# Patient Record
Sex: FEMALE | Race: WHITE
[De-identification: names, ages, dates, MRNs, and addresses within clinical notes are randomized per-mention and may not be internally consistent; named-entity substitution may affect disease eponyms.]

---

## 2017-02-01 ENCOUNTER — HOSPITAL ENCOUNTER (INPATIENT)
Dept: HOSPITAL 75 - 4TH | Age: 82
LOS: 2 days | Discharge: NURSING, REHAB OR LONG-TERM CARE | DRG: 481 | End: 2017-02-03
Attending: ORTHOPAEDIC SURGERY | Admitting: ORTHOPAEDIC SURGERY
Payer: MEDICARE

## 2017-02-01 VITALS — DIASTOLIC BLOOD PRESSURE: 68 MMHG | SYSTOLIC BLOOD PRESSURE: 112 MMHG

## 2017-02-01 VITALS — SYSTOLIC BLOOD PRESSURE: 118 MMHG | DIASTOLIC BLOOD PRESSURE: 61 MMHG

## 2017-02-01 VITALS — WEIGHT: 118 LBS | HEIGHT: 62 IN | BODY MASS INDEX: 21.71 KG/M2

## 2017-02-01 VITALS — DIASTOLIC BLOOD PRESSURE: 76 MMHG | SYSTOLIC BLOOD PRESSURE: 147 MMHG

## 2017-02-01 DIAGNOSIS — M80.052A: Primary | ICD-10-CM
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
## 2017-02-01 DIAGNOSIS — F41.9: ICD-10-CM

## 2017-02-01 DIAGNOSIS — M19.91: ICD-10-CM

## 2017-02-01 DIAGNOSIS — E03.9: ICD-10-CM

## 2017-02-01 DIAGNOSIS — I95.9: ICD-10-CM

## 2017-02-01 DIAGNOSIS — K59.09: ICD-10-CM

## 2017-02-01 DIAGNOSIS — Z87.891: ICD-10-CM
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
## 2017-02-01 DIAGNOSIS — D62: ICD-10-CM

## 2017-02-01 DIAGNOSIS — R41.0: ICD-10-CM
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
## 2017-02-01 DIAGNOSIS — K21.9: ICD-10-CM

## 2017-02-01 DIAGNOSIS — F31.9: ICD-10-CM

## 2017-02-01 PROCEDURE — 87081 CULTURE SCREEN ONLY: CPT

## 2017-02-01 PROCEDURE — 36415 COLL VENOUS BLD VENIPUNCTURE: CPT

## 2017-02-01 PROCEDURE — 83540 ASSAY OF IRON: CPT

## 2017-02-01 PROCEDURE — 80048 BASIC METABOLIC PNL TOTAL CA: CPT

## 2017-02-01 PROCEDURE — 85027 COMPLETE CBC AUTOMATED: CPT

## 2017-02-01 PROCEDURE — 0QH706Z INSERTION OF INTRAMEDULLARY INTERNAL FIXATION DEVICE INTO LEFT UPPER FEMUR, OPEN APPROACH: ICD-10-PCS | Performed by: ORTHOPAEDIC SURGERY

## 2017-02-01 PROCEDURE — 94664 DEMO&/EVAL PT USE INHALER: CPT

## 2017-02-01 RX ADMIN — SODIUM CHLORIDE, SODIUM LACTATE, POTASSIUM CHLORIDE, AND CALCIUM CHLORIDE SCH MLS/HR: 600; 310; 30; 20 INJECTION, SOLUTION INTRAVENOUS at 12:32

## 2017-02-01 RX ADMIN — SODIUM CHLORIDE, SODIUM LACTATE, POTASSIUM CHLORIDE, AND CALCIUM CHLORIDE SCH MLS/HR: 600; 310; 30; 20 INJECTION, SOLUTION INTRAVENOUS at 17:02

## 2017-02-01 RX ADMIN — MORPHINE SULFATE PRN MG: 4 INJECTION, SOLUTION INTRAMUSCULAR; INTRAVENOUS at 12:31

## 2017-02-01 RX ADMIN — DEXTROSE MONOHYDRATE AND SODIUM CHLORIDE SCH MLS/HR: 5; .45 INJECTION, SOLUTION INTRAVENOUS at 21:55

## 2017-02-01 NOTE — DIAGNOSTIC IMAGING REPORT
INDICATION: Intramedullary nailing



COMPARISON: None available



FINDINGS: Two intraoperative images of the left hip are

submitted dated February 1, 2017. Imaging was provided for Dr. Garza. Imaging demonstrates a short stem intramedullary olga

within the proximal left femur with alignment appearing

essentially anatomic. No left hip dislocation. No evidence of

immediate hardware complication.



IMPRESSION: Intraoperative fluoroscopy provided for placement of

a left gamma nail within the proximal left femur. Examination

was performed for Dr. Garza.



Fluoroscopy time: 59.5 seconds.



Dictated by:



Dictated on workstation # [id number]

## 2017-02-01 NOTE — HISTORY & PHYSICAL-SURGICAL
HPO-Surgical


History of Present Illness


Chief Complaint:  


fell today[facility] fracture of the left hip


Diagnosis/Surgical Indication:  nondisplaced intertrochanteric fracture of left 

hip


Procedure:  


intramedullary nailing of IT fracture left hip


Date of Surgery:  Feb 1, 2017


Weight (Pounds):  118


Weight (Ounces):  0.0


Height (Feet):  5


Height (Inches):  2.00





Allergies and Home Medications


Allergies


Coded Allergies:  


     Penicillins (Verified  Allergy, Unknown, 2/1/17)





Home Medications


Aspirin 81 Mg Tablet.dr 81 MG PO DAILY (Reported) 


Calcium Carbonate/Vitamin D3 1 Each Tablet 1 TAB PO BID (Reported) 


Escitalopram Oxalate 10 Mg Tablet 10 MG PO DAILY (Reported) 


Levothyroxine Sodium 125 Mcg Tablet 125 MCG PO DAILY (Reported) 


Multivitamin 1 Each Tablet 1 TAB PO DAILY (Reported) 


Omega 3 Polyunsat Fatty Acids 1,000 Mg Cap 1,000 MG PO DAILY (Reported) 





Past Medical-Social-Family Hx


Patient Social History


Alcohol Use:  Occasionally Uses


Recreational Drug Use:  No


Smoking Status:  Former Smoker


Physical Abuse Screen:  No


Sexual Abuse:  No


Recent Foreign Travel:  No


Contact w/other who traveled:  No


Recent Hopitalizations:  No


Recent Infectious Disease Expo:  No





Immunizations Up To Date


Date of Pneumonia Vaccine:  Oct 3, 2016


Date of Influenza Vaccine:  Oct 3, 2016





Seasonal Allergies


Seasonal Allergies:  No





Respiratory


Hx Respiratory Disorders:  No





Cardiovascular


Hx Cardiovascular Disorders:  No





Neurological


Hx Neurological Disorders:  No





Reproductive System


Pregnant:  No





Genitourinary


Hx Genitourinary Disorders:  No





Gastrointestinal


Gastrointestinal Disorders:  Gastroesophageal Reflux, Chronic Constipation





Musculoskeletal


Musculoskeletal Disorders:  Arthritis





Psychosocial


Behavioral Health Disorders:  Anxiety, Bipolar





Exam


Vital Signs


Capillary Refill :


General Appearance:  Oriented X3


HEENT:  Atraumatic, PERRLA


Respiratory:  Clear to Auscultation


Cardiovascular:  Regular Rate


Abdominal:  Soft, No Tenderness


Extremities:  No Clubbing, No Cyanosis, Normal Pulses, Other (Tenderness over 

left hip)


Skin:  No Rashes, No Breakdown


Neuro:  Normal Speech


Psych/Mental Status:  Mental Status NL





Assessment/Plan


Assessment and Plan


A: nondisplaced IT fracture left hip





P: intramedullary nailing left hip fracture, Cleocin preop, Plan for asa for 

DVT pro post op





Admission Diagnosis


nondisplaced intertrochanteric fracture of left hip (traumatic)








TRAE FARNSWORTH DO Feb 1, 2017 12:27 pm

## 2017-02-01 NOTE — CONSULTATION-HOSPITALIST
HPI


History of Present Illness:


HPI/Chief Complaint


CC: Left hip fracture sustained in fall at home





HPI: This is an 84yoWF that usually sees Dr Mehta who just moved his practice 

that presents to [facility] direct admit by Dr. Garza after transferred from 

[facility] due to left hip fracture sustained in a fall at home.  He 

actually repaired her right hip fracture in 2004 and had no residual since that 

time.  The plan is to have this repaired today at 1700.  Currently she is 

without pain and ready for repair.


Source:  patient, RN/MD


Exam Limitations:  no limitations


Date Seen


2/1/17


Attending Physician


Cem Garza DO


PCP





Referring Physician





Date of Admission


Feb 1, 2017 at 11:19





Home Medications & Allergies


Home Medications


Reviewed patient Home Medication Reconciliation Form





Allergies


Coded Allergies:  


     Penicillins (Verified  Allergy, Unknown, 2/1/17)





Past Medical-Social-Family Hx


Patient Social History


Marrital Status:  single


Employed/Student:  retired ([employer])


Alcohol Use:  Occasionally Uses


Recreational Drug Use:  No


Smoking Status:  Former Smoker


Physical Abuse Screen:  No


Sexual Abuse:  No


Recent Foreign Travel:  No


Contact w/other who traveled:  No


Recent Hopitalizations:  No


Recent Infectious Disease Expo:  No





Immunizations Up To Date


Date of Pneumonia Vaccine:  Oct 3, 2016


Date of Influenza Vaccine:  Oct 3, 2016





Seasonal Allergies


Seasonal Allergies:  No





Surgeries


HX Surgeries:  Yes


Surgeries:  Orthopedic





Respiratory


Hx Respiratory Disorders:  No





Cardiovascular


Hx Cardiovascular Disorders:  No





Neurological


Hx Neurological Disorders:  No





Reproductive System


Pregnant:  No





Genitourinary


Hx Genitourinary Disorders:  No





Gastrointestinal


Hx Gastrointestinal Disorders:  Yes


Gastrointestinal Disorders:  Gastroesophageal Reflux, Chronic Constipation





Musculoskeletal


Hx Musculoskeletal Disorders:  Yes


Musculoskeletal Disorders:  Arthritis





Endocrine


Hx Endocrine Disorders:  Yes


Endocrine Disorders:  Hypothyroidsim





HEENT


HX ENT Disorders:  No





Cancer


Hx Cancer:  No





Psychosocial


Hx Psychiatric Problems:  No


Behavioral Health Disorders:  Anxiety, Bipolar





Family Medical History


Significant Family History:  No Pertinent Family Hx





Review of Systems


Constitutional:   no symptoms reported see HPI


EENTM:   no symptoms reported see HPI


Respiratory:   no symptoms reported see HPI


Cardiovascular:   no symptoms reported see HPI


Gastrointestinal:   no symptoms reported see HPI


Genitourinary:   no symptoms reported see HPI


Musculoskeletal:   see HPI joint pain


Skin:   no symptoms reported see HPI


Psychiatric/Neurological:   No Symptoms Reported See HPI


All Other Systems Reviewed


Negative Unless Noted:  Yes





Physical Exam


Physical Exam


Vital Signs


Capillary Refill :


General Appearance:   No Apparent Distress WD/WN Chronically ill Thin


Eyes:  Bilateral Eye Normal Inspection, Bilateral Eye PERRL


HEENT:   PERRL/EOMI Normal ENT Inspection Pharynx Normal


Neck:   Full Range of Motion Normal Inspection Non Tender Supple Carotid Bruit


Respiratory:   Chest Non Tender Lungs Clear Normal Breath Sounds No Accessory 

Muscle Use No Respiratory Distress


Cardiovascular:   Regular Rate, Rhythm No Edema No Gallop No JVD No Murmur 

Normal Peripheral Pulses


Gastrointestinal:   Normal Bowel Sounds No Organomegaly No Pulsatile Mass Non 

Tender Soft


Back:   Normal Inspection No CVA Tenderness No Vertebral Tenderness


Extremity:   Normal Capillary Refill Normal Inspection Non Tender No Calf 

Tenderness No Pedal Edema Other (decreased ROM lower legs due to left hip 

fracture)


Neurologic/Psychiatric:   Alert Oriented x3 No Motor/Sensory Deficits Normal 

Mood/Affect


Skin:   Normal Color Warm/Dry


Lymphatic:   No Adenopathy





Assessment/Plan


Admission Diagnosis


Assessment:


Acute left hip fracture sustained in fall at home


History of right hip fracture 2000 for repair by Dr. Garza


Hypothyroidism


Presumed osteoporosis


Depression





Assessment and Plan


Surgical benefits outweigh medical risk so it would be prudent to proceed on 

with left hip fracture repair today at 1700 by Dr. Garza


Inpatient rehabilitation consult


Resume all home meds except for supplements


Monitor closely








JUSTO BRADSHAW DO Feb 1, 2017 12:43

## 2017-02-01 NOTE — PROGRESS NOTE-POST OPERATIVE
Post-Operative Progess Note


Assistant


Leonides Cooley NP-C





Pre-Operative Diagnosis


Nondisplaced Left hip intertrochanteric fracture





Post-Operative Diagnosis





same





Post-Op Procedure Note


Date of Procedure:  Feb 1, 2017


Name of Procedure:  


Intramedullary Nailing Intertrochanteric Fracture Left Hip


Procedure Note/Findings


nondisplaced IT fx  slight comminution greater trochanter


Anesthesia Type


General


Specimen(s) collected


none








TRAE FARNSWORTH DO Feb 1, 2017 6:37 pm

## 2017-02-02 VITALS — DIASTOLIC BLOOD PRESSURE: 52 MMHG | SYSTOLIC BLOOD PRESSURE: 90 MMHG

## 2017-02-02 VITALS — SYSTOLIC BLOOD PRESSURE: 97 MMHG | DIASTOLIC BLOOD PRESSURE: 58 MMHG

## 2017-02-02 VITALS — SYSTOLIC BLOOD PRESSURE: 105 MMHG | DIASTOLIC BLOOD PRESSURE: 66 MMHG

## 2017-02-02 VITALS — DIASTOLIC BLOOD PRESSURE: 53 MMHG | SYSTOLIC BLOOD PRESSURE: 112 MMHG

## 2017-02-02 VITALS — DIASTOLIC BLOOD PRESSURE: 63 MMHG | SYSTOLIC BLOOD PRESSURE: 103 MMHG

## 2017-02-02 VITALS — SYSTOLIC BLOOD PRESSURE: 92 MMHG | DIASTOLIC BLOOD PRESSURE: 53 MMHG

## 2017-02-02 LAB
ANION GAP SERPL CALC-SCNC: 10 MMOL/L (ref 5–14)
BUN SERPL-MCNC: 17 MG/DL (ref 7–18)
BUN/CREAT SERPL: 20
CALCIUM SERPL-MCNC: 7.5 MG/DL (ref 8.5–10.1)
CHLORIDE SERPL-SCNC: 104 MMOL/L (ref 98–107)
CO2 SERPL-SCNC: 25 MMOL/L (ref 21–32)
CREAT SERPL-MCNC: 0.84 MG/DL (ref 0.6–1.3)
ERYTHROCYTE [DISTWIDTH] IN BLOOD BY AUTOMATED COUNT: 12.9 % (ref 10–14.5)
GFR SERPLBLD BASED ON 1.73 SQ M-ARVRAT: > 60 ML/MIN
GLUCOSE SERPL-MCNC: 145 MG/DL (ref 70–105)
MCH RBC QN AUTO: 30 PG (ref 25–34)
MCHC RBC AUTO-ENTMCNC: 32 G/DL (ref 32–36)
MCV RBC AUTO: 94 FL (ref 80–99)
PLATELET # BLD: 201 10^3/UL (ref 130–400)
PMV BLD AUTO: 10.3 FL (ref 7.4–10.4)
POTASSIUM SERPL-SCNC: 4.1 MMOL/L (ref 3.6–5)
RBC # BLD AUTO: 2.78 10^6/UL (ref 4.35–5.85)
SODIUM SERPL-SCNC: 139 MMOL/L (ref 135–145)
WBC # BLD AUTO: 7.3 10^3/UL (ref 4.3–11)

## 2017-02-02 RX ADMIN — SODIUM CHLORIDE SCH MLS/HR: 900 INJECTION, SOLUTION INTRAVENOUS at 16:37

## 2017-02-02 RX ADMIN — DEXTROSE MONOHYDRATE AND SODIUM CHLORIDE SCH MLS/HR: 5; .45 INJECTION, SOLUTION INTRAVENOUS at 07:30

## 2017-02-02 RX ADMIN — LACTULOSE SCH GM: 20 SOLUTION ORAL at 11:33

## 2017-02-02 RX ADMIN — Medication SCH MG: at 09:12

## 2017-02-02 RX ADMIN — ASPIRIN SCH MG: 81 TABLET ORAL at 08:05

## 2017-02-02 RX ADMIN — CLINDAMYCIN PHOSPHATE SCH MLS/HR: 900 INJECTION, SOLUTION INTRAVENOUS at 01:29

## 2017-02-02 RX ADMIN — Medication SCH MG: at 17:00

## 2017-02-02 RX ADMIN — DOCUSATE SODIUM AND SENNOSIDES SCH EA: 8.6; 5 TABLET, FILM COATED ORAL at 21:02

## 2017-02-02 RX ADMIN — MORPHINE SULFATE PRN MG: 4 INJECTION, SOLUTION INTRAMUSCULAR; INTRAVENOUS at 11:22

## 2017-02-02 RX ADMIN — LACTULOSE SCH GM: 20 SOLUTION ORAL at 21:02

## 2017-02-02 RX ADMIN — KETOROLAC TROMETHAMINE PRN MG: 15 INJECTION, SOLUTION INTRAMUSCULAR; INTRAVENOUS at 11:23

## 2017-02-02 RX ADMIN — ENOXAPARIN SODIUM SCH MG: 100 INJECTION SUBCUTANEOUS at 08:06

## 2017-02-02 RX ADMIN — CLINDAMYCIN PHOSPHATE SCH MLS/HR: 900 INJECTION, SOLUTION INTRAVENOUS at 08:06

## 2017-02-02 RX ADMIN — SODIUM CHLORIDE, SODIUM LACTATE, POTASSIUM CHLORIDE, AND CALCIUM CHLORIDE SCH MLS/HR: 600; 310; 30; 20 INJECTION, SOLUTION INTRAVENOUS at 07:30

## 2017-02-02 RX ADMIN — LEVOTHYROXINE SODIUM SCH MCG: 125 TABLET ORAL at 05:42

## 2017-02-02 NOTE — OCCUPATIONAL THERAPY EVAL
OT Evaluation-General/PLF


Medical Diagnosis


Admission Date


Feb 1, 2017 at 11:19


Medical Diagnosis:  left hip fx


Onset Date:  Feb 1, 2017





Therapy Diagnosis


Therapy Diagnosis:  weakness, decr self care, decr functional mobility, decr 

activity tolerance





Height/Weight


Height (Feet):  5


Height (Inches):  2.00


Weight (Pounds):  118


Weight (Ounces):  0.0





Precautions


Precautions/Isolations:  Standard Precautions


Safety Interventions:  None





Weight Bear Status


Weight Bearing Restriction:  Weight Bearing/Tolerated


Location Restriction:  L LE





Referral


Physician:  blanca


Referral Reason:  Evaluation/Treatment





Medical History


Pertinent Medical History:  Arthritis, GERD, Hypothroidism


Additional Medical History


Chronic constipation, anxiety, depression, bipolar, osteoporosis. Hx R hip fx 

2004


Current History


Fell at home. Non-displaced intertrochanteric hip fx, repaired with nail on 2-1- 17


Reviewed History:  Yes





Social History


Home:  Single Level


Current Living Status:  Alone


Entry Into Home:  Stairs With Railing





ADL-Prior Level of Function


ADL PLOF Comments


Pt reported that she was previously able to take care of all of her basic self 

care needs. She is retired from [employer] and reported that she still drives.


Occupation:  retired from [employer] Self:  Yes





OT Current Status


Subjective


Pt seen in room, up in recliner, having difficulty staying awake. Nursing 

reported that she was just given pain meds. Pt said she was in pain but was 

unable to rate or describe it.





Appearance


Very sleepy, difficulty answering questions





Mental Status/Objective


Attachments:  Drains, Junior Catheter, IV





Current


Hand Dominance:  Right


Upper Extremity ROM


Unable to assess due to sleepiness


Upper Extremity Strength


Unable to assess due to sleepiness





ADL-Treatment


ADL-Current


Pt was unable to stay awake to assess ADLs. Transfers mod assist with PT


              Functional [place] Measure


0=Not Assessed/NA   4=Minimal Assistance


1=Total Assistance   5=Supervision or Setup


2=Maximal Assistance   6=Modified [place]


3=Moderate Assistance   7=Complete IndependenceIRFPAI Quality Coding Scale











6 Independent with activity with or without an assistive device


 


5  Patient requires set up or clean up by helper.  Patient completes activity  

by  themselves


 


4 Supervision or touching assist (CGA). [place] provide cues , steadying assist


 


3 The helper provides less than half the effort to complete the activity


 


2 The helper provides more than half the effort to complete the activity


 


1 Dependent.  The helper does all the effort to complete an activity 


 


7 Patient refused to complete or attempt activity


 


9 The patient did not perform the activity before the current illness or injury


 


88 Not attempted due to Medical conditions or safety concerns











Education


OT Patient Education:  Purpose of tx/functional activities, Rehab process


Teaching Recipient:  Patient


Teaching Methods:  Discussion


Response to Teaching:  Verbalize Understanding





OT Long Term Goals


Long Term Goals


Time Frame:  2-7-17


Eating (FIM):  6


Grooming(FIM):  6


Bathing(FIM):  5


Upper Body Dressing(FIM):  5


Lower Body Dressing(FIM):  5


Toileting(FIM):  5


Toilet/Commode Transfer(FIM):  5


Shower Transfer(FIM):  5


Additional Goals:  2-Verbalize Understanding, 3-ImproveStrength/Eavns


1=Demonstrate adherence to instructed precautions during ADL tasks.


2=Patient will verbalize/demonstrate understanding of assistive devices/

modifications for ADL.


3=Patient will improve strength/tolerance for activity to enable patient to 

perform ADL's.





OT Education/Plan


Problem List/Assessment


Assessment:  Decreased Activ Tolerance, Decreased UE Strength, Dependent 

Transfers, Impaired Bed Mobility, Impaired Funct Balance, Impaired Self-Care 

Skills





Discharge Recommendations


Plan/Recommendations:  Continue POC





Treatment Plan/Plan of Care


Treatment,Training & Education:  Yes


Patient would benefit from OT for education, treatment and training to promote 

independence in ADL's, mobility, safety and/or upper extremity function for ADL'

s.


Plan of Care:  ADL Retraining, Functional Mobility, UE Funct Exercise/Act


Treatment Duration:  Feb 7, 2017


# of days/week


5


Visits Per Week:  5


Agreement:  Yes


Rehab Potential:  Good





Time/GCodes


Start Time:  11:30


Stop Time:  11:40


Total Time Billed (hr/min):  10


Billed Treatment Time


visit, 10 minutes evaluation moderate intensity








NOMAN YOUNG OT Feb 2, 2017 11:53

## 2017-02-02 NOTE — ANESTHESIA-GENERAL POST-OP
General


Patient Condition


Mental Status/LOC:  Same as Preop


Cardiovascular:  Satisfactory


Nausea/Vomiting:  Absent


Respiratory:  Satisfactory


Pain:  Controlled


Complications:  Absent





Post Op Complications


Complications


None





Follow Up Care/Instructions


Patient Instructions


None needed.





Anesthesia/Patient Condition


Patient Condition


Patient is doing well, no complaints, stable vital signs, no apparent adverse 

anesthesia problems.   


No complications reported per nursing.








LISETH CHRISTIANSEN CRNA Feb 2, 2017 14:29

## 2017-02-02 NOTE — PHYSICAL THERAPY DAILY NOTE
PT Daily Note-Current


Subjective


Patient is in recliner and states she is having a hard time waking up.





Pain





   Numeric Pain Scale:  5-Moderate Pain


   Location:  Left


   Location Body Site:  Hip


   Pain Description:  Acute





Mental Status


Patient Orientation:  Listless


Attachments:  Oxygen, Junior Catheter, IV





Transfers


              Functional [place] Measure


0=Not Assessed/NA   4=Minimal Assistance


1=Total Assistance   5=Supervision or Setup


2=Maximal Assistance   6=Modified [place]


3=Moderate Assistance   7=Complete IndependenceIRFPAI Quality Coding Scale











6 Independent with activity with or without an assistive device


 


5  Patient requires set up or clean up by helper.  Patient completes activity  

by  themselves


 


4 Supervision or touching assist (CGA). [place] provide cues , steadying assist


 


3 The helper provides less than half the effort to complete the activity


 


2 The helper provides more than half the effort to complete the activity


 


1 Dependent.  The helper does all the effort to complete an activity 


 


7 Patient refused to complete or attempt activity


 


9 The patient did not perform the activity before the current illness or injury


 


88 Not attempted due to Medical conditions or safety concerns








Transfers (B, C, W/C) (FIM):  2


Scootin


Supine to/from Sit:  2


Sit to/from Stand:  4


Bed to/from Chair:  4


 minimal assist with transfer chair to bed with max assist with all bed 

mobility.





Weight Bearing


Weight Bearing Restriction:  Weight Bearing/Tolerated


Location Restriction:  L LE





Gait Training


Gait (FIM):  1


Distance (FIM):  1=up to 49 ft


Distance:  8'


Gait Level of Assist:  4


Gait Persons Needed:  1


Gait Assistive Device:  FWW





Assessment


PT attempted to perform exercises, however, patient fell asleep during 

treatment.  Patient is in bed with all rails up and call light in hand.





PT Short Term Goals


Short Term Goals


Time Frame:  2017


Gait (FIM):  2


Distance (FIM):  1=up to 49 ft


Gait Assistive Device:  FWW





PT Plan


Treatment/Plan


Treatment Plan:  Continue Plan of Care


Treatment Plan:  Bed Mobility, Education, Functional Activity [place], Functional 

Strength, Gait, Safety, Therapeutic Exercise, Transfers


Treatment Duration:  2017


Visits Per Week:  11


Minutes/Day (M-F):  prn





Time/GCodes


Time In:  1440


Time Out:  1450


Total Billed Treatment Time:  10


Total Billed Treatment


1 visit


FA 10 min








PASCUAL KENDALL PT 2017 15:04

## 2017-02-02 NOTE — PROGRESS NOTE-HOSPITALIST
Progress Note


HPI/CC on Admission


CC: Left hip fracture sustained in fall at home





HPI: This is an 84yoWF that usually sees Dr Mehta who just moved his practice 

that presents to [facility] direct admit by Dr. Garza after transferred from 

[facility] due to left hip fracture sustained in a fall at home.  He 

actually repaired her right hip fracture in 2004 and had no residual since that 

time.  The plan is to have this repaired today at 1700.  Currently she is 

without pain and ready for repair.





Progress Notes/Assess & Plan


Date Seen


2/2/17


Admission Dx/Process


Assessment:


Acute left hip fracture sustained in fall at home


History of right hip fracture 2000 for repair by Dr. Garza


Hypothyroidism


Presumed osteoporosis


Depression


Diagonsis/Assessment & Plan


Chart Review:


No fever


Vitals stable


Hgb 8.3 so checking iron level and empirically starting on Venofer


BNP normal





SW Review:


Pt will be a great candidate for rehab, and will have rehab eval shortly.





Patient Interview:


Pt states that her pain is manageable, but has increased recently.


Pt states that she has not been sleeping well.


Physical exam was stable.


Pt's last BM was multiple days ago.





I noted slight slowing of cognition making it higher risk for acute delirium 

considering advanced age





AFVSS, [place], O x 3 but a bit slowed compared to yesterday


Regular rate and rhythm, clear to auscultation bilaterally


No edema





Laboratory Tests


2/2/17 05:09














Assessment:


Acute left hip fracture sustained in fall at home s/p repair POD # 1


History of right hip fracture 2000 for repair by Dr. Garza


Hypothyroidism


Presumed osteoporosis


Depression


Constipation


Early delirium








Plan:


Lactulose


PT/OT


In-pt rehab eval


Monitor closely


Monitor cognition since high risk for delirium





Scribed by [place] under the direct supervision of Dr. Bradshaw.








JUSTO BRADSHAW DO Feb 2, 2017 10:55

## 2017-02-02 NOTE — PROGRESS NOTE (SOAP)
Subjective


Subjective/Events-last exam


POD #1 s/p left hip IM nailing. She is doing well this morning, pain is 

controlled, no complaints. minimal output on hemovac drain





Objective


Exam





 Vital Signs








  Date Time  Temp Pulse Resp B/P Pulse Ox O2 Delivery O2 Flow Rate FiO2


 


2/2/17 04:00 97.3 87 18 97/58 97 Room Air  


 


2/2/17 00:00 98.5 89 18 103/63 99 Room Air  


 


2/1/17 21:19       2.00 


 


2/1/17 21:10     96 Nasal Cannula 2.00 


 


2/1/17 20:00 96.8 97 16 112/68 95 Room Air  


 


2/1/17 16:00 99.4 73 18 118/61 96 Room Air  


 


2/1/17 12:00 98.0 67 18 147/76 97 Room Air  














 I & O 


 


 2/2/17





 07:00


 


Intake Total 1125 ml


 


Output Total 1885 ml


 


Balance -760 ml





Capillary Refill : Less Than 3 Seconds


General Appearance:   No Apparent Distress


Respiratory:   No Accessory Muscle Use No Respiratory Distress


Cardiovascular:   Normal Peripheral Pulses


Peripheral Pulses:  2+ Left Dors-Pedis (L)


Extremity:   Normal Capillary Refill Normal Inspection No Calf Tenderness No 

Pedal Edema


Neurologic/Psychiatric:   Alert Oriented x3 No Motor/Sensory Deficits Normal 

Mood/Affect


Skin:   Normal Color Warm/Dry (dressing to left hip CDI, minimal output of 

hemovac drain)





Results


Lab


 Laboratory Tests


2/2/17 05:09: 


Anion Gap 10, BUN/Creatinine Ratio 20, Blood Urea Nitrogen 17, Calcium Level 

7.5L, Carbon Dioxide Level 25, Chloride Level 104, Creatinine 0.84, Estimat 

Glomerular Filtration Rate > 60, Glucose Level 145H, Hematocrit 26L, Hemoglobin 

8.3L, Mean Corpuscular Hemoglobin 30, Mean Corpuscular Hemoglobin Concent 32, 

Mean Corpuscular Volume 94, Mean Platelet Volume 10.3, Platelet Count 201, 

Potassium Level 4.1, Red Blood Count 2.78L, Red Cell Distribution Width 12.9, 

Sodium Level 139, White Blood Count 7.3





Assessment/Plan


Assessment/Plan


Assess & Plan/Chief Complaint


A: s/p left hip IM nailing for nondisplaced IT fracture of left hip


Acute blood loss anemia


Hypotension





P:  for DC planning. Plan on discharge tomorrow vs Saturday. Plan 

to pull hemovac drain tomorrow AM, Up to chair TID, niferex for anemia, Dr. Pandya to manage IM


Diagnosis/Problems:  


(1) Intertrochanteric fracture of left hip


Qualifiers:  


   Qualified Code:  S72.142D - Displaced intertrochanteric fracture of left 

femur, subsequent encounter for closed fracture with routine healing








NARCISO VELOZ Feb 2, 2017 6:52 am

## 2017-02-02 NOTE — OPERATIVE REPORT
PROCEDURE PHYSICIAN:   TRAE FARNSWORTH

 

DATE OF PROCEDURE:  

02/01/2017

 

PREOPERATIVE DIAGNOSIS:

Nondisplaced intertrochanteric fracture, left hip. 

 

POSTOPERATIVE DIAGNOSIS:

Nondisplaced intertrochanteric fracture, left hip. 

 

PROCEDURE:

Intramedullary nailing intertrochanteric fracture, left hip. 

 

SURGEON:

Greg EUGENE ASSISTANT:

YESSENIA Ellison

Surgical first assist duties: Leonides Cooley surgical first

assistant was utilized throughout the entire procedure for

patient positioning, retraction of soft tissues, placement of the

internal fixation device, wound closure, and patient transfer. 

 

ANESTHESIA:

General. 

 

INDICATIONS:

The patient is an 84-year-old female who fell noting immediate

left hip pain. She was evaluated initially at [facility] in [place] where x-rays revealed a nondisplaced

intertrochanteric fracture, left hip. The patient was transported

to [facility] where an intramedullary nailing, of the

left hip fracture was performed utilizing the Synthes

trochanteric fixation nail utilizing an 11 mm 130 degrees T1

cannulated trochanteric fixation nail, 170 mm in length. A 90 mm

helical blade was utilized along with a 34 mm locking screw. 

 

PROCEDURE IN DETAIL:

The patient was transported to the operating room, where general

inhalation anesthetic was administered. The patient was placed

supine on the fracture table.  The right lower extremity was

draped out of the operating field. The left lower extremity was

placed in traction with slight traction and internal rotation.

The C-arm was used to verify fracture reduction. The patient had

slight comminution of the greater trochanter.  The fracture

otherwise remained nondisplaced. A ChloraPrep and sterile drape

of the left hip and left lower extremity was performed. A

longitudinal incision was made proximal to the area of the

greater trochanter. The incision was deepened to be the

iliotibial band. A guide pin was placed at the tip of the greater

trochanter. This was advanced into the central aspect of the

femoral shaft and verified fluoroscopically in AP and lateral

plane.  The guide pin was then over reamed. A 170 mm x 11 mm

trochanteric fixation nail was then inserted into the femoral

shaft. The out  was then used to define an additional

location for the incision distally and incision was made through

the skin fat medial iliotibial band and bluntly through the

vastus lateralis the outrigger guide was then placed in the

lateral femoral cortex. A guide pin was placed through the

lateral femoral cortex through the central aspect of the femoral

neck centered on the AP x-ray and through the posterior aspect of

the neck with a lateral x-ray. 

 

The lateral cortex was opened with the cannulated reamer.

Additional bone was then reamed up to the subchondral bone. A 90

mm helical blade was then impacted into position. The locking

mechanism was then employed to secure the trochanteric fixation

nail to the helical blade. 

 

The outrigger guide was then used to place a guidepin through the

lateral femoral cortex through the distal aspect of the nail and

through the medial cortex. A 34 mm screw was then inserted.

Wounds were irrigated with normal saline solution. The

fluoroscope revealed satisfactory position alignment and fixation

position and continuing maintenance of an anatomic position of

the intertrochanteric fracture. 

 

The iliotibial band was closed with 0-Vicryl suture simply.

Subcutaneous tissues were closed with 0 and 2-0 Vicryl suture. A

drain was placed in the distal incision. The skin was closed with

stainless steel staples. An Adaptic Neosporin bulky dressing was

placed about the left hip. The patient was awakened and was

transported postop recovery with anesthesia personnel present in

satisfactory condition. 

 

 

 

Job ID: [id number]

Dictated Date: 02/01/2017 23:56:57 

Transcription Date: 02/02/2017 13:45:57 / guerline

## 2017-02-02 NOTE — PHYSICAL THERAPY EVALUATION
PT Evaluation-General


Medical Diagnosis


Admission Date


2017 at 11:19


Medical Diagnosis:  left hip fx


Onset Date:  2017





Therapy Diagnosis


Therapy Diagnosis:  weakness; abn gait





Height/Weight


Height (Feet):  5


Height (Inches):  2.00


Weight (Pounds):  118


Weight (Ounces):  0.0





Precautions


Precautions/Isolations:  Standard Precautions





Weight Bear Status


Weight Bearing Restriction:  Weight Bearing/Tolerated





Referral


Physician:  blanca


Reason for Referral:  Evaluation/Treatment





Medical History


Pertinent Medical History:  Arthritis, GERD


Additional Medical History


anxiety, bipolar


Current History


Pt sustained a fall at home, unsure of cause and fractured her left hip; post 

repair with IM nail.  WBAT


Reviewed History:  Yes





Social History


Home:  Single Level


Current Living Status:  Alone


Entry Into Home:  Stairs With Railing





Prior/Core FIM


Prior Level of Function


              Functional [place] Measure


0=Not Assessed/NA   4=Minimal Assistance


1=Total Assistance   5=Supervision or Setup


2=Maximal Assistance   6=Modified [place]


3=Moderate Assistance   7=Complete [place]


Bed Mobility:  7


Transfers (B,C,W/C) (FIM):  7


Gait:  7


Pt indep at [facility]; cared for her home, shopped for groceries and still drives.  

No AD currently.





PT Evaluation-Current


Subjective


Agrees to PT.





Pain





   Numeric Pain Scale:  5-Moderate Pain


   Location:  Left


   Location Body Site:  Hip


   Pain Description:  Ache


   Comment:  took pain meds prior to treatment





Objective


Patient Orientation:  Person, Place, Time, Situation


Problem Solving:  Fair


Attachments:  Oxygen (insitu during and post treatment), IV





ROM/Strength


ROM Lower Extremities


WFL; left hip flexion limited due to pain


Strenght Lower Extremities


Right LE strength grossly 4/5; left LE strength grossly 3/5.





Integumentary/Posture


Integumentary


intact


Bowel Incontinence:  No


Bladder Incontinence:  Junior Cath


Posture


normal and symmetrical





Neuromuscular


(Tone, Coordination, Reflexes)


decreased coordination left LE with active movement.





Sensory


Vision:  Wears Glasses


Hearing:  Functional


Hand Dominance:  Right


Sensation Right Lower Extremit:  Intact


Sensation Left Lower Extremity:  Intact





Transfers


              Functional [place] Measure


0=Not Assessed/NA   4=Minimal Assistance


1=Total Assistance   5=Supervision or Setup


2=Maximal Assistance   6=Modified [place]


3=Moderate Assistance   7=Complete [place]


Transfers (B, C, W/C) (FIM):  3


Scooting:  3


Rollin


Supine to/from Sit:  4 (assist with left LE)


Sit to/from Stand:  3 (mod assist to come to a stand. )


bed t/f WC(FIM only if WC use):  4 (FWW with close CGA and skilled cues for 

sequencing. )


Frequent cues to sequence and complete task.





Gait


Mode of Locomotion:  Walk


Anticipated Mode of Locomotion:  Walk


Comments/Gait Description


Pt only took 6-7 steps to transfer bed to chair with FWW.  Tends to slant to 

the left and stay very close to the left side of the walker.  Difficulty 

advancing left LE and then accepting weight L.  Short steps with no heel strike 

or toe off note.d





Balance


Sitting Static:  Good


Sitting Dynamic:  Good


Standing Static:  Fair


 Standing Dynamic:  Fair





Treatment


Worked on weight shifting and foot advancement left as well as weight 

acceptance.  Slow with transfer and needs extra cues and extra time.  Pt in 

recliner with legs elevated and needs met post treatment.





Assessment/Needs


Post fall with left hip fracture.  Pt presents with decreased functional 

strength L LE, decreased balance with standing and difficulty with bed mobility

, tranfers and ambulation.  She would significantly benefit from skilled PT 

services to addres mobility and safety deficits to allow her to return home 

alone as she was prior to the fall.


Rehab Potential:  Good





PT Short Term Goals


Short Term Goals


Time Frame:  2017


Transfers (B,C,W/C) (FIM):  4


Gait (FIM):  2


Distance (FIM):  1=up to 49 ft


Gait Assistive Device:  FWW





PT Plan


Problem List


Problem List:  Activity Tolerance, Functional Strength, Safety, Balance, Gait, 

Transfer, Bed Mobility





Treatment/Plan


Treatment Plan:  Continue Plan of Care


Treatment Plan:  Bed Mobility, Education, Functional Activity [place], Functional 

Strength, Gait, Safety, Therapeutic Exercise, Transfers


Treatment Duration:  2017


# of days/week


6


Visits Per Week:  11


Minutes/Day (M-F):  prn





Safety Risks/Education


Patient Education:  Transfer Techniques, Reviewed Precautions, Safety Issues


Teaching Recipient:  Patient


Teaching Methods:  Demonstration, Discussion


Response to Teaching:  Reinforcement Needed





Time/GCodes


Time In:  1010


Time Out:  1045


Total Billed Treatment Time:  35


Total Billed Treatment


visit


EVM 15


FA 20








BRE SNYDER PT 2017 10:56

## 2017-02-03 ENCOUNTER — HOSPITAL ENCOUNTER (INPATIENT)
Dept: HOSPITAL 75 - IRF | Age: 82
LOS: 13 days | Discharge: HOME HEALTH | DRG: 560 | End: 2017-02-16
Attending: PHYSICAL MEDICINE & REHABILITATION | Admitting: PHYSICAL MEDICINE & REHABILITATION
Payer: MEDICARE

## 2017-02-03 VITALS — DIASTOLIC BLOOD PRESSURE: 73 MMHG | SYSTOLIC BLOOD PRESSURE: 124 MMHG

## 2017-02-03 VITALS — DIASTOLIC BLOOD PRESSURE: 56 MMHG | SYSTOLIC BLOOD PRESSURE: 113 MMHG

## 2017-02-03 VITALS — DIASTOLIC BLOOD PRESSURE: 58 MMHG | SYSTOLIC BLOOD PRESSURE: 140 MMHG

## 2017-02-03 VITALS — BODY MASS INDEX: 19.71 KG/M2 | WEIGHT: 107.1 LBS | HEIGHT: 62 IN

## 2017-02-03 VITALS — DIASTOLIC BLOOD PRESSURE: 63 MMHG | SYSTOLIC BLOOD PRESSURE: 100 MMHG

## 2017-02-03 VITALS — DIASTOLIC BLOOD PRESSURE: 58 MMHG | SYSTOLIC BLOOD PRESSURE: 114 MMHG

## 2017-02-03 DIAGNOSIS — R06.89: ICD-10-CM

## 2017-02-03 DIAGNOSIS — E03.9: ICD-10-CM

## 2017-02-03 DIAGNOSIS — F32.9: ICD-10-CM

## 2017-02-03 DIAGNOSIS — Z99.81: ICD-10-CM

## 2017-02-03 DIAGNOSIS — D64.9: ICD-10-CM

## 2017-02-03 DIAGNOSIS — R44.3: ICD-10-CM

## 2017-02-03 DIAGNOSIS — K59.00: ICD-10-CM

## 2017-02-03 DIAGNOSIS — M80.052D: Primary | ICD-10-CM

## 2017-02-03 DIAGNOSIS — F03.90: ICD-10-CM

## 2017-02-03 LAB
ANION GAP SERPL CALC-SCNC: 8 MMOL/L (ref 5–14)
BUN SERPL-MCNC: 12 MG/DL (ref 7–18)
BUN/CREAT SERPL: 16
CALCIUM SERPL-MCNC: 7.5 MG/DL (ref 8.5–10.1)
CHLORIDE SERPL-SCNC: 106 MMOL/L (ref 98–107)
CO2 SERPL-SCNC: 24 MMOL/L (ref 21–32)
CREAT SERPL-MCNC: 0.74 MG/DL (ref 0.6–1.3)
ERYTHROCYTE [DISTWIDTH] IN BLOOD BY AUTOMATED COUNT: 12.8 % (ref 10–14.5)
GFR SERPLBLD BASED ON 1.73 SQ M-ARVRAT: > 60 ML/MIN
GLUCOSE SERPL-MCNC: 106 MG/DL (ref 70–105)
MCH RBC QN AUTO: 31 PG (ref 25–34)
MCHC RBC AUTO-ENTMCNC: 32 G/DL (ref 32–36)
MCV RBC AUTO: 95 FL (ref 80–99)
PLATELET # BLD: 166 10^3/UL (ref 130–400)
PMV BLD AUTO: 10.3 FL (ref 7.4–10.4)
POTASSIUM SERPL-SCNC: 3.7 MMOL/L (ref 3.6–5)
RBC # BLD AUTO: 2.35 10^6/UL (ref 4.35–5.85)
SODIUM SERPL-SCNC: 138 MMOL/L (ref 135–145)
WBC # BLD AUTO: 7.1 10^3/UL (ref 4.3–11)

## 2017-02-03 PROCEDURE — 85018 HEMOGLOBIN: CPT

## 2017-02-03 PROCEDURE — 86850 RBC ANTIBODY SCREEN: CPT

## 2017-02-03 PROCEDURE — 80053 COMPREHEN METABOLIC PANEL: CPT

## 2017-02-03 PROCEDURE — 81000 URINALYSIS NONAUTO W/SCOPE: CPT

## 2017-02-03 PROCEDURE — 85014 HEMATOCRIT: CPT

## 2017-02-03 PROCEDURE — 86920 COMPATIBILITY TEST SPIN: CPT

## 2017-02-03 PROCEDURE — 85025 COMPLETE CBC W/AUTO DIFF WBC: CPT

## 2017-02-03 PROCEDURE — 36415 COLL VENOUS BLD VENIPUNCTURE: CPT

## 2017-02-03 PROCEDURE — 86901 BLOOD TYPING SEROLOGIC RH(D): CPT

## 2017-02-03 PROCEDURE — 86900 BLOOD TYPING SEROLOGIC ABO: CPT

## 2017-02-03 RX ADMIN — DOCUSATE SODIUM AND SENNOSIDES SCH EA: 8.6; 5 TABLET, FILM COATED ORAL at 09:16

## 2017-02-03 RX ADMIN — LACTULOSE SCH GM: 20 SOLUTION ORAL at 09:00

## 2017-02-03 RX ADMIN — SODIUM CHLORIDE SCH MLS/HR: 900 INJECTION, SOLUTION INTRAVENOUS at 00:57

## 2017-02-03 RX ADMIN — DOCUSATE SODIUM AND SENNOSIDES SCH EA: 8.6; 5 TABLET, FILM COATED ORAL at 10:03

## 2017-02-03 RX ADMIN — Medication SCH MG: at 18:01

## 2017-02-03 RX ADMIN — ENOXAPARIN SODIUM SCH MG: 100 INJECTION SUBCUTANEOUS at 06:24

## 2017-02-03 RX ADMIN — DOCUSATE SODIUM AND SENNOSIDES SCH EA: 8.6; 5 TABLET, FILM COATED ORAL at 21:00

## 2017-02-03 RX ADMIN — LACTULOSE SCH GM: 20 SOLUTION ORAL at 09:16

## 2017-02-03 RX ADMIN — Medication SCH MG: at 06:24

## 2017-02-03 RX ADMIN — ASPIRIN SCH MG: 81 TABLET ORAL at 09:16

## 2017-02-03 RX ADMIN — KETOROLAC TROMETHAMINE PRN MG: 15 INJECTION, SOLUTION INTRAMUSCULAR; INTRAVENOUS at 10:19

## 2017-02-03 RX ADMIN — LEVOTHYROXINE SODIUM SCH MCG: 125 TABLET ORAL at 06:24

## 2017-02-03 NOTE — PHYSICAL THERAPY EVALUATION
PT Evaluation-General


Medical Diagnosis


Admission Date





Medical Diagnosis:  intertrochanteric hip fracture l





Height/Weight


Height (Feet):  5


Height (Inches):  2.00


Weight (Pounds):  118


Weight (Ounces):  0.0





Medical History


Pertinent Medical History:  Arthritis, GERD, Hypothroidism





Prior/Core FIM


Prior Level of Function


              Functional [place] Measure


0=Not Assessed/NA   4=Minimal Assistance


1=Total Assistance   5=Supervision or Setup


2=Maximal Assistance   6=Modified [place]


3=Moderate Assistance   7=Complete [place]





PT Evaluation-Current


Transfers


              Functional [place] Measure


0=Not Assessed/NA   4=Minimal Assistance


1=Total Assistance   5=Supervision or Setup


2=Maximal Assistance   6=Modified [place]


3=Moderate Assistance   7=Complete IndependenceIRFPAI Quality Coding Scale











6 Independent with activity with or without an assistive device


 


5  Patient requires set up or clean up by helper.  Patient completes activity  

by  themselves


 


4 Supervision or touching assist (CGA). [place] provide cues , steadying assist


 


3 The helper provides less than half the effort to complete the activity


 


2 The helper provides more than half the effort to complete the activity


 


1 Dependent.  The helper does all the effort to complete an activity 


 


7 Patient refused to complete or attempt activity


 


9 The patient did not perform the activity before the current illness or injury


 


88 Not attempted due to Medical conditions or safety concerns














BRE SNYDER PT Feb 3, 2017 10:31

## 2017-02-03 NOTE — DISCHARGE SUMMARY-HOSPITALIST
Diagnosis/Chief Complaint


Date of Admission


Feb 1, 2017 at 11:19


Date of Discharge


Feb 3, 2017 at 10:20


Discharge Date:  Feb 3, 2017


Discharge Time:  1200


Admission Diagnosis


Assessment:


Acute left hip fracture sustained in fall at home


History of right hip fracture 2000 for repair by Dr. Garza


Hypothyroidism


Presumed osteoporosis


Depression





Discharge Diagnosis


Assessment:


Acute left hip fracture sustained in fall at home


History of right hip fracture 2000 for repair by Dr. Garza


Hypothyroidism


Presumed osteoporosis


Depression


Postop anemia due to acute blood loss


Mild cognitive decline noted








Chart Review:


No fever


Vitals stable


Hgb 8.3 so checking iron level and empirically starting on Venofer


BNP normal





SW Review:


Pt will be a great candidate for rehab, and will have rehab eval shortly.





Patient Interview:


Pt states that her pain is manageable, but has increased recently.


Pt states that she has not been sleeping well.


Physical exam was stable.


Pt's last BM was multiple days ago.





I noted slight slowing of cognition making it higher risk for acute delirium 

considering advanced age





AFVSS, Pleasant, O x 3 but a bit slowed compared to yesterday


Regular rate and rhythm, clear to auscultation bilaterally


No edema





Laboratory Tests


2/2/17 05:09














Assessment:


Acute left hip fracture sustained in fall at home s/p repair POD # 1


History of right hip fracture 2000 for repair by Dr. Garza


Hypothyroidism


Presumed osteoporosis


Depression


Constipation


Early delirium








Plan:


Lactulose


PT/OT


In-pt rehab eval


Monitor closely


Monitor cognition since high risk for delirium





Scribed by [place] under the direct supervision of Dr. Bradshaw.





(1) Intertrochanteric fracture of left hip


Status:  Acute





Reason Hospital Visit/Course


CC: Left hip fracture sustained in fall at home





HPI: This is an 84yoWF that usually sees Dr Mehta who just moved his practice 

that presents to [facility] direct admit by Dr. Garza after transferred from 

[facility] due to left hip fracture sustained in a fall at home.  He 

actually repaired her right hip fracture in 2004 and had no residual since that 

time.  The plan is to have this repaired today at 1700.  Currently she is 

without pain and ready for repair.





Chart Review:


Max fever 99.5


Vitals stable


Hgb 7.2


Anemia is likely due to dilution


Iron is high at 202





Patient Interview:


Dr. Bradshaw discusses moving to in-pt rehab.


Pt states that she feels stable.


Pt is able to urinate and has had multiple BMs.


Physical exam was stable.


Pt has not requests at this time.





No fever, vital signs stable, pleasant, slowed mentation subtle finding


Regular rate and rhythm, clear to auscultation bilaterally


No edema





Plan:


DC IVF


Pt will move to in-pt rehab





Scribed by [place] under the direct supervision of Dr. Bradshaw.





Hospital course: Patient had an uneventful hospital course following a hip 

fracture repair by Dr. Garza.  She did have postop anemia from acute blood 

loss but on the day of discharge of hemoglobin of 7.2 is more dilutional so IV 

fluids were hep-locked.  Pain was controlled and will go to inpatient 

rehabilitation and will evaluate further for any supportive needs she may need.





Discharge Summary


Discharge Physical Examination


Allergies:  


Coded Allergies:  


     Penicillins (Verified  Allergy, Unknown, 2/1/17)


Vitals & I&Os





Vital Signs








  Date Time  Temp Pulse Resp B/P Pulse Ox O2 Delivery O2 Flow Rate FiO2


 


2/3/17 08:00 99.5 106 18 114/58 96 Room Air  


 


2/2/17 21:00       2.00 











Hospital Course


Labs (last 24 hrs)


 Laboratory Tests


2/2/17 12:20: Iron Level 202H


2/3/17 06:11: 


Anion Gap 8, BUN/Creatinine Ratio 16, Blood Urea Nitrogen 12, Calcium Level 7.5L

, Carbon Dioxide Level 24, Chloride Level 106, Creatinine 0.74, Estimat 

Glomerular Filtration Rate > 60, Glucose Level 106H, Hematocrit 22L, Hemoglobin 

7.2L, Mean Corpuscular Hemoglobin 31, Mean Corpuscular Hemoglobin Concent 32, 

Mean Corpuscular Volume 95, Mean Platelet Volume 10.3, Platelet Count 166, 

Potassium Level 3.7, Red Blood Count 2.35L, Red Cell Distribution Width 12.8, 

Sodium Level 138, White Blood Count 7.1





 Microbiology


2/1/17 MRSA Screen - Final, Complete


         MRSA not isolated





Pending Labs


Laboratory Tests


2/3/17 06:11: 


Anion Gap 8, BUN/Creatinine Ratio 16, Blood Urea Nitrogen 12, Calcium Level 7.5

, Carbon Dioxide Level 24, Chloride Level 106, Creatinine 0.74, Estimat 

Glomerular Filtration Rate > 60, Glucose Level 106, Hematocrit 22, Hemoglobin 

7.2, Mean Corpuscular Hemoglobin 31, Mean Corpuscular Hemoglobin Concent 32, 

Mean Corpuscular Volume 95, Mean Platelet Volume 10.3, Platelet Count 166, 

Potassium Level 3.7, Red Blood Count 2.35, Red Cell Distribution Width 12.8, 

Sodium Level 138, White Blood Count 7.1








Discharge


Home Medications:





 Active Scripts


 Active


Tramadol HCl 50 Mg Tablet 50 Mg PO Q6H PRN


Aspirin EC (Aspirin) 81 Mg Tablet.dr 324 Mg PO DAILY


Senna-Time S Tablet (Sennosides/Docusate Sodium) 1 Each Tablet 1 Ea PO BID


Ferrex 150 (Iron Polysaccharide Complex) 150 Mg Capsule 150 Mg PO BID WITH MEALS


 Reported


Daily Multiple Vitamin (Multivitamin) 1 Each Tablet 1 Tab PO DAILY


Levothyroxine Sodium 125 Mcg Tablet 125 Mcg PO DAILY


Fish Oil 1,000 mg Capsule (Omega 3 Polyunsat Fatty Acids) 1,000 Mg Cap 1,000 Mg 

PO DAILY


Calcium 600 + Vit D Caplet (Calcium Carbonate/Vitamin D3) 1 Each Tablet 1 Tab 

PO BID


Escitalopram Oxalate 10 Mg Tablet 10 Mg PO DAILY





Instructions to patient/family


Please see electonic discharge instructions given to patient.





Problem Qualifiers





(1) Intertrochanteric fracture of left hip:  


Encounter type:  subsequent encounter  Fracture type:  closed  Fracture healing

:  with routine healing  Qualified Code:  S72.142D - Displaced 

intertrochanteric fracture of left femur, subsequent encounter for closed 

fracture with routine healing





JUSTO BRADSHAW DO Feb 3, 2017 11:26

## 2017-02-03 NOTE — ST COGNITIVE LINGUISTIC EVAL
Speech Evaluation-General


Medical Diagnosis


intertrochanteric hip fx left


Onset Date:  2017





Therapy Diagnosis


Therapy Diagnosis:  Moderate Cognitive Impairment





Precautions


Precautions/Isolations:  Standard Precautions





Referral


Referring Physician:  Dr. Edi Cruz


Reason for Referral:  Evaluation/Treatment


Cognitive, Speech, and Language Evaluation





Medical History


Pertinent Medical History:  Arthritis, GERD, Hypothroidism


Reviewed History:  Yes





Social History


Current Living Status:  Alone





Speech PLF-Current Status


Prior Level of Function





The patient was unable to provide prior level of function of cognition,


speech, or language to the clinician.





Subjective


The patient was recently admitted to [facility] following 

a intertrochanteric left hip fracture. The patient greeted the clinician 

appropriately and agreed to participate in the cognitive, speech, and language 

evaluation on this date. To note, the patient appeared fatigued, frequently 

closing eyes throughout evaluation.





Language Eval: Auditory


Comprehends Simple Yes/No Ques:  Mild


Indent/Objects Multiple Fields:  Functional


Ident/Pics in Multiple Fields:  Mild (Patient identified one of three pictures 

accurately.)


Follows 1-Step Commands:  Moderate


Follows Complex Directions:  Moderate


Follows General Conversations:  Moderate





Language Eval: Verbal Language


Completes Spontaneous Greeting:  Functional


Produces Auto, Serial Info:  Moderate


Imitates Simple Words/Phrases:  Mild


Word Finding:  Moderate


Requests Basic Needs:  Mild


States Basic Personal Info:  Moderate (The patient was unable to state date of 

birth, address, or physical location to the clinician.)


Expresses Complex Ideas:  Moderate





Cognitive


Patient Orientation


The patient was oriented to month and year. The patient stated the day was 

Monday and the date was the .





Objective Cognitive Domain


Attention:  Moderate


Memory:  Moderate


Problem Solving:  Moderate


Executive Functions:  Moderate


Visuospatial Skills:  Moderate


Clock Drawing Severity Rating:  Moderate





Objective


Formal/Standardized Tests


Jefferson Cognitive Assessment (MoCA) Version One: The patient was provided the 

MoCA with results of + correlating to a moderate to severe cognitive 

impairment. The patient's largest deficits included immediate and delayed recall

, orientation, and problem solving.


Impression


The patient demonstrated a moderate to severe cognitive impairment. The patient'

s largest deficits included immediate and delayed recall, orientation, and 

problem solving.





Communication/Social Cognition


Comprehension:  2


Expression:  3


Social Interaction:  3


Problem Solvin


Memory:  2





Speech Patient Assess


Expression of Ideas/Wants:  Frequently (2)


Understanding Vebal Content:  Sometimes Understands(2)


Brief Interview-Mental Status:  Yes


Repetition of Three Words:  Two (2)


Temporal Orientation: Year:  Correct (3)


Temporal Orientation: Month:  Accurate within 5 days(2)


Temporal Orientation: Day:  Incorrect or No Answer(0)


Recall : Wear to say "Sock":  Yes, no cue required (2)


Recall : Color:  No, could not recall (0)


Recall : Bed:  No, could not recall (0)





Speech Short Term Goals


Short Term Goals


Short Term Goals


1. The patient will recall and demonstrate three functional memory strategies.


2. The patient will display 80% accuracy with safety problem solving with mild 

clinician cueing.


3. The patient will demonstrated 90% accuracy (independently) with simple 

orientation information.


Time Frame-STG:  Two Weeks





Speech Long Term Goals


Long Term Goals


1. The patient will demonstrate improved cognition for increased safety and 

function with ADL's in the least restrictive setting.


Time Frame:  Three Weeks


Comprehension:  4


Expression:  5


Social Interaction:  5


Problem Solvin


Memory:  4





Speech-Plan


Treatment Plan


Speech Therapy Treatment Plan:  Continue Plan of Care


Cognitive therapy to focus on functional memory strategies and safety problem 

solving.


Treatment Duration:  2017


# of days/week


Four to Five


Visits Per Week:  Four to Five


Minutes/Day (M-F):  30


Rehab Potential:  Fair





Safety Risks/Education


Teaching Recipient:  Patient


Teaching Methods:  Discussion


Response to Teaching:  Reinforcement Needed


Education Topics Provided:  


Plan of Care





Time


Speech Therapy Time In:  12:00


Speech Therapy Time Out:  12:20


Total Billed Time:  20


Billed Treatment Time


1 MILDRED MELENDEZ Feb 3, 2017 13:33

## 2017-02-03 NOTE — THERAPY GROUP DAILY NOTE
Therapy Daily Group Note


Patient Education Topic


Other List Below (ARU description)





Exercises


LE Seated Exercise, UE Exercise





Other/Notes


Pt was transported with w/c from room to [facility] area for OT/PT group.  

Group consisted of introductions (name, place living, silliest thing done in 

childhood), ARU description, seated UE/LE exercises, socialization, 

communication, problem solving and critical thinking.  Pt had difficulty 

contributing to discussions and required cues for problem solving.  Pt was able 

to complete seated UE/LE exercises.  After therapy, pt lying in bed with call 

light/phone in reach and safety measures in place.  All needs met in room.


Start Time:  13:00


Stop Time:  14:00


Total Billed Treatment Time:  60


Total Billed Treatment


1-GRP








BRE PLAZA Feb 3, 2017 14:34

## 2017-02-03 NOTE — PHYSICAL THERAPY EVALUATION
PT Evaluation-General


Medical Diagnosis


Admission Date


2/3/17


Medical Diagnosis:  intertrochanteric hip fx left


Onset Date:  2017





Therapy Diagnosis


Therapy Diagnosis:  weakness; abn gait





Height/Weight


Height (Feet):  5


Height (Inches):  2.00


Weight (Pounds):  118


Weight (Ounces):  0.0





Precautions


Precautions/Isolations:  Standard Precautions





Weight Bear Status


Weight Bearing Restriction:  Weight Bearing/Tolerated


Location Restriction:  L LE





Referral


Physician:  Anthony


Reason for Referral:  Evaluation/Treatment





Medical History


Pertinent Medical History:  Arthritis, GERD, Hypothroidism


Additional Medical History


bipolar, anxiety


Current History


Pt fell at home, unsure how the fall occurred.  Sustained a left hip fracture.  

S/P repair with IM nail.  Pt is WBAT.


Reviewed History:  Yes





Social History


Home:  Single Level


Current Living Status:  Alone


Entry Into Home:  Stairs With Railing





Prior/Core FIM


Prior Level of Function


              Functional [place] Measure


0=Not Assessed/NA   4=Minimal Assistance


1=Total Assistance   5=Supervision or Setup


2=Maximal Assistance   6=Modified [place]


3=Moderate Assistance   7=Complete [place]


Bed Mobility:  7


Transfers (B,C,W/C) (FIM):  7


Gait:  7


Pt able to care for herself; pt able to do her own grocery shopping and does 

still do limited driving.  Reports she has a FWW from previous knee replacement.





PT Evaluation-Current


Subjective


Agrees to PT.  Reports she will have some help when she discharges if 

necessary.  Reports she has 3 daughters, but they all live out of town.





Pain





   Numeric Pain Scale:  8


   Location:  Left


   Location Body Site:  Hip


   Pain Description:  Ache


   Comment:  has taken pain meds.





Objective


Patient Orientation:  Person, Confused, Place, Time (vaguely), Situation (

vaguely)


Problem Solving:  Fair





ROM/Strength


ROM Lower Extremities


WFL all planes LE


Strenght Lower Extremities


Right LE strength is grossly 4/5


Left LE strength is grossly 3/5 throughout due to recent surgery.





Integumentary/Posture


Integumentary


intact


Bowel Incontinence:  No


Bladder Incontinence:  No


Posture


upright and symmetrical





Neuromuscular


(Tone, Coordination, Reflexes)


Decreased coordination left LE; other is WFL





Sensory


Vision:  Wears Glasses


Hearing:  Functional


Hand Dominance:  Right


Sensation Right Lower Extremit:  Intact


Sensation Left Lower Extremity:  Intact





Transfers


              Functional [place] Measure


0=Not Assessed/NA   4=Minimal Assistance


1=Total Assistance   5=Supervision or Setup


2=Maximal Assistance   6=Modified [place]


3=Moderate Assistance   7=Complete IndependenceIRFPAI Quality Coding Scale











6 Independent with activity with or without an assistive device


 


5  Patient requires set up or clean up by helper.  Patient completes activity  

by  themselves


 


4 Supervision or touching assist (CGA). [place] provide cues , steadying assist


 


3 The helper provides less than half the effort to complete the activity


 


2 The helper provides more than half the effort to complete the activity


 


1 Dependent.  The helper does all the effort to complete an activity 


 


7 Patient refused to complete or attempt activity


 


9 The patient did not perform the activity before the current illness or injury


 


88 Not attempted due to Medical conditions or safety concerns








Transfers (B, C, W/C) (FIM):  3


Scooting:  3


Rolling:  3


Roll Left to Right (QC):  3


Supine to/from Sit:  3 (asssit with both legs to lift into bed)


Sit to/from Stand:  4 (min assist with cues for hand placement)


bed t/f WC(FIM only if WC use):  4


Sit to Lying (QC):  3


Lying to Sitting/Side of Bed(Q:  4


Sit to Stand (QC):  4 (assist to lift body; cues for hand placement)


Chair/Bed-to-Chair Xfer(QC):  4


Car Transfer (QC):  88 (unable to attempt due to safety concerns.)





Gait


Does the Patient Walk?:  Yes


Mode of Locomotion:  Walk


Anticipated Mode of Locomotion:  Walk


Gait (FIM):  2


Distance (FIM):  5=028-15 ft


Walk 10 feet (QC):  4 (fWW with CGA and cues for turns. )


Walk 50 ft with 2 Turns(QC):  4


Walk 150 ft (QC):  88 (unable to ambulate that distance at this time)


Walking 10ft/uneven surface-QC:  4 (close CGA)


Distance:  50 ft x 2; 40 ft x 1


Gait Level of Assist:  4 (close CGA)


Gait Assistive Device:  FWW


Comments/Gait Description


Decreased step length and foot clearance bilaterally; initially, difficulty 

with weight acceptance on the left foot.





Wheelchair Training


Does the Pt Use a Wheelchair?:  No





Stairs


Stairs (FIM):  1


#of Steps:  1


Level of Assist:  4 (min assist to weight shift)


1 Step (curb) (QC):  3


4 Steps (QC):  88 (unable to attempt)


Assistive Device:  Walker


12 Steps (QC):  88


Difficulty with 1 step, 4 or 12 not possible at this time.





Balance


Sitting Static:  Fair


Sitting Dynamic:  Fair


Standing Static:  Fair


 Standing Dynamic:  Fair


Picking up an Object (QC):  88 (fall risk; unsafe)





Treatment


Gait training with FWW with work on turns and step length; focus on functional 

balance an safety as well.





Assessment/Needs


Pt presents post repair of left hip fracture.  She demonstrates left LE weakness

, decreased functional balance, decreased ability to transfer, limited 

ambulation and decreased functional safety.  She will benefit from skilled PT 

services to address these deficits and work on her mobility to allow her to 

return home and care for herself.


Rehab Potential:  Good





PT Short Term Goals


Short Term Goals


Time Frame:  2017


Transfers (B,C,W/C) (FIM):  4


Gait (FIM):  4


Distance (FIM):  3=150 ft


Gait Assistive Device:  FWW


Stairs (FIM):  2


# of Steps:  4





PT Long Term Goals


Long Term Goals


PT Long Term Goals Time Frame:  2017


Transfers (B,C,W/C) (FIM):  7


Sit to Lying (QC):  6


Lying-Sitting on Side/Bed(QC):  6


Sit to Stand (QC):  6


Rollin


Roll Left to Right (QC):  6


Chair/Bed-to-Chair Xfer(QC):  6


Car Transfer (QC):  5


Does the Patient Walk:  Yes


Gait (FIM):  6


Gait distance (FIM):  3=150 ft


Walk 10 feet (QC):  6


Walk 10ft-Uneven Surface(QC):  6


Walk 50ft with 2 Turns (QC):  6


Walk 150 ft (QC):  6


Gait Level of Assist:  6


Gait Assistive Device:  FWW


Does the Pt use WC or Scooter?:  No


Stairs (FIM):  5


# of Steps:  8 (household distance)


1 Step (curb) (QC):  6


4 Steps (QC):  6


12 Steps (QC):  88 (no goal set; not indicated)


Stairs Level Of Assist:  6


Picking up an Object (QC):  5 (supervision)


All goals set that will allow the patient to be mod indep with functional 

mobility and able to care for herself.





PT Plan


Problem List


Problem List:  Activity Tolerance, Functional Strength, Safety, Balance, Gait, 

Transfer, Bed Mobility





Treatment/Plan


Treatment Plan:  Continue Plan of Care


Treatment Plan:  Bed Mobility, Education, Functional Activity [place], Functional 

Strength, Group Therapy, Gait, Safety, Therapeutic Exercise, Transfers


Treatment Duration:  2017


# of days/week


5-6


Visits Per Week:  10-15


Minutes/Day (M-F):  60-90


Minutes/Day (Sat/Cardona):  prn





Safety Risks/Education


Patient Education:  Transfer Techniques, Safety Issues


Teaching Recipient:  Patient


Teaching Methods:  Discussion


Response to Teaching:  Reinforcement Needed





Discharge Recommendations


Plan


Progress functional mobility and strength.





Time/GCodes


Time In:  1020


Time Out:  1050


Total Billed Treatment Time:  30


Total Billed Treatment


visit


EVM 15


GT 15








BRE SNYDER PT Feb 3, 2017 10:31

## 2017-02-03 NOTE — DISCHARGE INST-SURGICAL
Discharge Inst-Surgical


Depart Medication/Instructions


New, Converted or Re-Newed RX:  RX on Chart





Consults/Follow Up


Goal/Follow Up Appt.:  


f/u in 2 1/2 weeks with Dr. Garza


Patient Instructions:  


Continue WBAT with walker for assistance


Continue ice to operative site


Continue bilat DONNA hose


Up to chair TID


remove staples pod #10 (2/11/17) and apply steri strips


Daily island dressing changes until staples removed





Activity


Activity as Tolerated:  No





Walker and staff assist


Walking Assistive Device:  Walker


Activity Instructions:  Avoid Pulling & Pushing, Avoid Stress to Incision


Elevate Extremity:  Elevate Above Heart


Incentive Spirometry:  Every 2 Hours While Awake


Avoid ALL Tobacco Products:  Smoking of Any Kind





Diet


Discharge Diet:  No Restrictions


Symptoms to Report to Physicia:  Swelling Increased, Bleeding Excessive, Fever 

Over 101 Degrees F


If Any Problems/Questions/Issu:  Contact Your Physician





Skin/Wound Care


Infection Signs and Symptoms:  Increased Redness, Increased Drainage, Skin 

Itchy or Has a Rash, Increased Swelling, Temperature Above 101  F


Bathing Instructions:  Shower


Operative Area Clean and Dry:  You May Remove Bandage


Stitches/Staples/Dermabond Dis:  Care of Staples


Ice Pack:  Ice On and Off Site








NARCISO VELOZ Feb 3, 2017 06:29

## 2017-02-03 NOTE — OCCUPATIONAL THERAPY EVAL
OT Evaluation-General/PLF


Medical Diagnosis


Admission Date


Feb 3, 2017 at 10:20


Medical Diagnosis:  intertrochanteric hip fx left


Onset Date:  Feb 1, 2017





Therapy Diagnosis


Therapy Diagnosis:  weakness, decr self care, decr functional mobility, decr 

activity tolerance





Height/Weight


Height (Feet):  5


Height (Inches):  2.00


Weight (Pounds):  118


Weight (Ounces):  0.0





Precautions


Precautions/Isolations:  Standard Precautions





Weight Bear Status


Weight Bearing Restriction:  Weight Bearing/Tolerated


Location Restriction:  L [place]





Referral


Physician:  Anthony


Referral Reason:  Evaluation/Treatment





Medical History


Pertinent Medical History:  Arthritis, GERD, Hypothroidism


Additional Medical History


Chronic constipation, anxiety, depression, bipolar, osteoporosis, hx hip fx 2005


Current History


Fell at home. Nondisplaced intertrochanteric hip fx, repaired with nail on 2-1- 17


Reviewed History:  Yes





Social History


Home:  Single Level


Current Living Status:  Alone


Entry Into Home:  Stairs With Railing


Pt reported two sisters live nearby





ADL-Prior Level of Function


ADL PLOF Comments


Pt reported that she was previously able to take care of all of her basic self 

care needs. She is retired from [employer] and reported that she still drives.


DME/Equipment Comments


Pt reported that she has both a tub and tub/shower and uses "whichever one 

helps me the most." She indicated that she has no safety equipment. Pt may not 

be good historian


Occupation:  retired from [employer]


Drive Self:  Yes





OT Current Status


Subjective


Pt seen in room, up in recliner. Had difficulty staying awake initially but did 

better after bathing and activity. She reported pain at 0/10





Appearance


Difficulty staying awake. Confused at times and not oriented





Mental Status/Objective


Patient Orientation:  Person, Confused


Attachments:  [place]





Current


Glasses/Contacts:  Yes


Hearing Aids:  No


Dentures/Partials:  Yes


Hand Dominance:  Right


Upper Extremity ROM


Grossly WFL bilat


Upper Extremity Sensation


Pt reported some numbness bilat thumb, index and middle fingers and stated that 

she had CTS


Upper Extremity Strength


Grossly 4/45 bilat, with some difficulty following instructions for testing


Edema:  No UE observed





ADL-Treatment


              Functional [place] Measure


0=Not Assessed/NA   4=Minimal Assistance


1=Total Assistance   5=Supervision or Setup


2=Maximal Assistance   6=Modified [place]


3=Moderate Assistance   7=Complete IndependenceIRFPAI Quality Coding Scale











6 Independent with activity with or without an assistive device


 


5  Patient requires set up or clean up by helper.  Patient completes activity  

by  themselves


 


4 Supervision or touching assist (CGA). [place] provide cues , steadying assist


 


3 The helper provides less than half the effort to complete the activity


 


2 The helper provides more than half the effort to complete the activity


 


1 Dependent.  The helper does all the effort to complete an activity 


 


7 Patient refused to complete or attempt activity


 


9 The patient did not perform the activity before the current illness or injury


 


88 Not attempted due to Medical conditions or safety concerns








Eating (FIM):  6 (Dentures. Cant eat nuts without teeth in. May need some cues 

to initiate eating at mealtime)


Eating (QC):  6


Grooming (FIM):  5 (setup)


Oral Hygiene (QC):  5 (setup)


Bathing (FIM):  4 (Sponge bath. CGA, FWW when standing to wash bottom and booker. 

Unable to reach L foot. )


Shower/Bathe Self (QC):  3


Upper Body Dressing (FIM):  4 (Min assist. Attempted to put button up top on 

backwards. Slow with buttons)


Upper Body Dressing (QC):  3


Lower Body Dressing (FIM):  3 (Help getting pants over L foot. CGA when 

standing with FWW to pull up. Unable to doff/don slipper sock on L foot but 

setup on R)


Lower Body Dressing (QC):  3


On/Off Footwear (QC):  3 (Can do one foot but not the other)


Toileting (FIM):  4 (CGA standing to pull pants up and down. Could wipe booker 

front when standing, CGA, FWW)


Toileting Hygiene (QC):  4


Transfers (B, C, W/C) (FIM):  4 (CGA, help steering walker)


Toilet/Commode Transfer (FIM):  4 (Min assist, tall toilet, grab bars, from w/c)


Toilet Transfer (QC):  3


Shower Transfer (FIM):  0


All ADLs took longer than usual. Some discomfort and difficulty reaching L foot.





Other Treatments


Pt left up in recliner, care transferred to Speech





Education


OT Patient Education:  Modified ADL techniques (lower body dressing), Purpose 

of tx/functional activities, Rehab process, Transfer techniques (cues for hand 

palcement)


Teaching Recipient:  Patient


Teaching Methods:  Demonstration, Discussion


Response to Teaching:  Verbalize Understanding, Return Demonstration, 

Reinforcement Needed





OT Short Term Goals


Short Term Goals


Time Frame:  Feb 17, 2017


Bathing(FIM):  5


Toileting(FIM):  5


Toilet/Commode Transfer(FIM):  5


Shower Transfer(FIM):  5


Additional Short Term Goals:  2-Verbalize Understanding, 3-ImproveStrength/[place]


1=Demonstrate adherence to instructed precautions during ADL tasks.


2=Patient will verbalize/demonstrate understanding of assistive devices/

modifications for ADL.


3=Patient will improve strength/tolerance for activity to enable patient to 

perform ADL's.





OT Long Term Goals


Long Term Goals


Time Frame:  Feb 24, 2017


Eating (FIM):  6


Eating (QC):  6


Oral Hygiene (QC):  6


Grooming(FIM):  6


Bathing(FIM):  6


Shower/Bathe Self (QC):  6


Upper Body Dressing(FIM):  6


Upper Body Dressing (QC):  6


Lower Body Dressing(FIM):  6


Lower Body Dressing (QC):  6


On/Off Footwear (QC):  6


Toileting(FIM):  6


Toileting Hygiene (QC):  6


Toilet/Commode Transfer(FIM):  6


Toilet/Commode Transfer (QC):  6


Comprehension(FIM):  4


Expression (FIM):  5


Social Interaction(FIM):  5


Problem Solving(FIM):  4


Memory(FIM):  4


Additional Goals:  2-Verbalize Understanding, 3-ImproveStrength/[place]


1=Demonstrate adherence to instructed precautions during ADL tasks.


2=Patient will verbalize/demonstrate understanding of assistive devices/

modifications for ADL.


3=Patient will improve strength/tolerance for activity to enable patient to 

perform ADL's.





OT Education/Plan


Problem List/Assessment


Assessment:  Decreased Activ Tolerance, Decreased Safety Aware, Decreased UE 

Strength, Dependent Transfers, Impaired Funct Balance, Impaired Self-Care Skills


Pt would benefit from skilled OT to increase her independence in basic self 

care to allow her to safely return to her home to live independently and to 

decrease caregiver burden





Discharge Recommendations


Plan/Recommendations:  Continue POC





Treatment Plan/Plan of Care


Treatment,Training & Education:  Yes


Patient would benefit from OT for education, treatment and training to promote 

independence in ADL's, mobility, safety and/or upper extremity function for ADL'

s.


Plan of Care:  ADL Retraining, Functional Mobility, Group Exercise/Act as Ind (

education, exercise, activity tolerance, memory, socialization, functional 

activities), UE Funct Exercise/Act, UE Neuromus Re-Ed/Coord


Treatment Duration:  Feb 24, 2017


# of days/week


5-6


Visits Per Week:  10-11


Minutes/Day (M-F):  75-90


Minutes/Day (Sat/Cardona):  PRN


Agreement:  Yes


Rehab Potential:  Fair





Time/GCodes


Start Time:  11:15


Stop Time:  12:00


Total Time Billed (hr/min):  45


Billed Treatment Time


visit, evaluation moderate complexity 15 minutes, 30 minutes ADL








NOMAN YOUNG OT Feb 3, 2017 13:47

## 2017-02-03 NOTE — PROGRESS NOTE (SOAP)
Subjective


Subjective/Events-last exam


POD 2 s/p IM nailing of IT fracture left hip, she is doing better today. No 

complaints. urine output is improved.





Objective


Exam





 Vital Signs








  Date Time  Temp Pulse Resp B/P Pulse Ox O2 Delivery O2 Flow Rate FiO2


 


2/3/17 04:00 99.7 84 20 140/58 97 Room Air  


 


2/3/17 00:00 99.0 94 20 113/56 99 Room Air  


 


2/2/17 21:00      Nasal Cannula 2.00 


 


2/2/17 20:58 99.1 90 18 112/53 96 Room Air  


 


2/2/17 18:25       2.00 


 


2/2/17 16:35 98.6 85 18 90/52 95 Room Air  


 


2/2/17 12:00 98.0 83 16 105/66 99 Room Air  


 


2/2/17 10:06 97.5 86 20 92/53 95 Room Air  


 


2/2/17 09:00     96 Nasal Cannula 2.00 














 I & O 


 


 2/3/17





 07:00


 


Intake Total 2710 ml


 


Output Total 450 ml


 


Balance 2260 ml





Capillary Refill : Less Than 3 Seconds


General Appearance:   No Apparent Distress


Peripheral Pulses:  2+ Dorsalis Pedis (R), 2+ Left Dors-Pedis (L)


Gastrointestinal:   non tender soft


Extremity:   Normal Capillary Refill Normal Inspection Non Tender No Pedal 

Edema (dressing left hip cdi)


Neurologic/Psychiatric:   Alert Oriented x3 No Motor/Sensory Deficits Normal 

Mood/Affect


Skin:   Normal Color Warm/Dry





Results


Lab


 Laboratory Tests


2/2/17 12:20: Iron Level 202H


2/3/17 06:11: 


Hematocrit 22L, Hemoglobin 7.2L, Mean Corpuscular Hemoglobin 31, Mean 

Corpuscular Hemoglobin Concent 32, Mean Corpuscular Volume 95, Mean Platelet 

Volume 10.3, Platelet Count 166, Red Blood Count 2.35L, Red Cell Distribution 

Width 12.8, White Blood Count 7.1





 Microbiology


2/1/17 MRSA Screen - Final, Complete


         MRSA not isolated





Assessment/Plan


Assessment/Plan


Assess & Plan/Chief Complaint


A: s/p left hip IM nailing for nondisplaced IT fracture of left hip


Acute blood loss anemia - labs pending


Hypotension - resolved








P: She is doing well from a orthopedic standpoint, ortho signing off at this 

time. Patient looks good for transfer to [facility]. Will wait for Dr. Pandya to OK 

the transfer to IRF. Scripts on chart for pain meds


Diagnosis/Problems:  


(1) Intertrochanteric fracture of left hip


Qualifiers:  


   Qualified Code:  S72.142D - Displaced intertrochanteric fracture of left 

femur, subsequent encounter for closed fracture with routine healing








NARCISO VELOZ Feb 3, 2017 06:26

## 2017-02-03 NOTE — PHYSICAL THERAPY DAILY NOTE
PT Daily Note-Current


Subjective


Patient states she is very fatigued.





Pain





   Numeric Pain Scale:  7


   Location:  Left


   Location Body Site:  Hip


   Pain Description:  Acute





Appearance


BP after treatment 91/45; HR 83; SAO2 95% RA





Mental Status


Patient Orientation:  Confused





Transfers


              Functional [place] Measure


0=Not Assessed/NA   4=Minimal Assistance


1=Total Assistance   5=Supervision or Setup


2=Maximal Assistance   6=Modified [place]


3=Moderate Assistance   7=Complete IndependenceIRFPAI Quality Coding Scale











6 Independent with activity with or without an assistive device


 


5  Patient requires set up or clean up by helper.  Patient completes activity  

by  themselves


 


4 Supervision or touching assist (CGA). [place] provide cues , steadying assist


 


3 The helper provides less than half the effort to complete the activity


 


2 The helper provides more than half the effort to complete the activity


 


1 Dependent.  The helper does all the effort to complete an activity 


 


7 Patient refused to complete or attempt activity


 


9 The patient did not perform the activity before the current illness or injury


 


88 Not attempted due to Medical conditions or safety concerns








Transfers (B, C, W/C) (FIM):  4


Scootin


Sit to/from Stand:  4


Sit to Stand (QC):  4





Weight Bearing


Weight Bearing Restriction:  Weight Bearing/Tolerated


Location Restriction:  L LE





Gait Training


Does the Patient Walk?:  Yes


Gait (FIM):  1


Distance (FIM):  1=up to 49 ft


Distance:  45' x 3


Gait Level of Assist:  4


Gait Persons Needed:  1


Gait Assistive Device:  FWW


slow, step to gait sequence





Exercises


Supine Ex:  Ankle pumps, Quad Set, Heel Slides, Straight leg raise


Supine Reps:  15





Assessment


Patient is very quiet and appears lethargic.  BP is low and patient is having 

difficulty remaining awake.  PT to increase activity as tolerated by patient.





PT Plan


Treatment/Plan


Treatment Plan:  Continue Plan of Care





Time/GCodes


Time In:  1050


Time Out:  1115


Total Billed Treatment Time:  25


Total Billed Treatment


1 visit


GT 15 min


EX 10 min








PASCUAL KENDALL PT Feb 3, 2017 11:57

## 2017-02-04 VITALS — SYSTOLIC BLOOD PRESSURE: 137 MMHG | DIASTOLIC BLOOD PRESSURE: 73 MMHG

## 2017-02-04 VITALS — DIASTOLIC BLOOD PRESSURE: 71 MMHG | SYSTOLIC BLOOD PRESSURE: 131 MMHG

## 2017-02-04 VITALS — DIASTOLIC BLOOD PRESSURE: 72 MMHG | SYSTOLIC BLOOD PRESSURE: 136 MMHG

## 2017-02-04 VITALS — DIASTOLIC BLOOD PRESSURE: 52 MMHG | SYSTOLIC BLOOD PRESSURE: 98 MMHG

## 2017-02-04 VITALS — DIASTOLIC BLOOD PRESSURE: 65 MMHG | SYSTOLIC BLOOD PRESSURE: 111 MMHG

## 2017-02-04 VITALS — DIASTOLIC BLOOD PRESSURE: 72 MMHG | SYSTOLIC BLOOD PRESSURE: 134 MMHG

## 2017-02-04 VITALS — SYSTOLIC BLOOD PRESSURE: 129 MMHG | DIASTOLIC BLOOD PRESSURE: 69 MMHG

## 2017-02-04 VITALS — SYSTOLIC BLOOD PRESSURE: 112 MMHG | DIASTOLIC BLOOD PRESSURE: 65 MMHG

## 2017-02-04 VITALS — DIASTOLIC BLOOD PRESSURE: 63 MMHG | SYSTOLIC BLOOD PRESSURE: 111 MMHG

## 2017-02-04 RX ADMIN — Medication SCH MG: at 06:46

## 2017-02-04 RX ADMIN — DOCUSATE SODIUM AND SENNOSIDES SCH EA: 8.6; 5 TABLET, FILM COATED ORAL at 20:36

## 2017-02-04 RX ADMIN — ASPIRIN SCH MG: 81 TABLET ORAL at 09:05

## 2017-02-04 RX ADMIN — Medication SCH EA: at 06:46

## 2017-02-04 RX ADMIN — Medication SCH MG: at 16:55

## 2017-02-04 RX ADMIN — OMEGA-3 FATTY ACIDS CAP 1000 MG SCH MG: 1000 CAP at 06:46

## 2017-02-04 RX ADMIN — LEVOTHYROXINE SODIUM SCH MCG: 125 TABLET ORAL at 06:43

## 2017-02-04 RX ADMIN — DOCUSATE SODIUM AND SENNOSIDES SCH EA: 8.6; 5 TABLET, FILM COATED ORAL at 09:00

## 2017-02-04 NOTE — PHYSICAL THERAPY DAILY NOTE
PT Daily Note-Current


Subjective


Pt. c/o pain at 8/10 before rx and 6/10 after in left hip





Pain





   Numeric Pain Scale:  6


   Location:  Left


   Location Body Site:  Hip


   Pain Description:  Burning





Mental Status


Patient Orientation:  Normal For Age





Transfers


              Functional [place] Measure


0=Not Assessed/NA   4=Minimal Assistance


1=Total Assistance   5=Supervision or Setup


2=Maximal Assistance   6=Modified [place]


3=Moderate Assistance   7=Complete IndependenceIRFPAI Quality Coding Scale











6 Independent with activity with or without an assistive device


 


5  Patient requires set up or clean up by helper.  Patient completes activity  

by  themselves


 


4 Supervision or touching assist (CGA). [place] provide cues , steadying assist


 


3 The helper provides less than half the effort to complete the activity


 


2 The helper provides more than half the effort to complete the activity


 


1 Dependent.  The helper does all the effort to complete an activity 


 


7 Patient refused to complete or attempt activity


 


9 The patient did not perform the activity before the current illness or injury


 


88 Not attempted due to Medical conditions or safety concerns








Transfers (B, C, W/C) (FIM):  4


Scootin


Rollin


Supine to/from Sit:  4


Sit to/from Stand:  4


Bed to/from Chair:  4


assist LLE into bed





Weight Bearing


Weight Bearing Restriction:  Weight Bearing/Tolerated


Location Restriction:  L LE





Gait Training


Does the Patient Walk?:  Yes


Distance (FIM):  5=881-28 ft (50x2)


Gait Level of Assist:  4


Gait Persons Needed:  1


Gait Assistive Device:  FWW


short dist gait secondary to pain c/o





Exercises


Supine Ex:  Ankle pumps, Quad Set, Heel Slides, Short Arc Quads, Hip abd/add


Supine Reps:  12





Assessment


Current Status:  Good Progress


pain improved after Rx with pt. in bed and positioned with HOB up and LEs 

elevated a little





PT Short Term Goals


Short Term Goals


Time Frame:  2017


Gait (FIM):  4


Distance (FIM):  3=150 ft


Gait Assistive Device:  FWW


Stairs (FIM):  2


# of Steps:  4





PT Long Term Goals


Long Term Goals


PT Long Term Goals Time Frame:  2017


Transfers (B,C,W/C) (FIM):  7


Sit to Lying (QC):  6


Lying-Sitting on Side/Bed(QC):  6


Sit to Stand (QC):  6


Rollin


Roll Left to Right (QC):  6


Chair/Bed-to-Chair Xfer(QC):  6


Car Transfer (QC):  5


Does the Patient Walk:  Yes


Gait (FIM):  6


Gait distance (FIM):  3=150 ft


Walk 10 feet (QC):  6


Walk 10ft-Uneven Surface(QC):  6


Walk 50ft with 2 Turns (QC):  6


Walk 150 ft (QC):  6


Gait Level of Assist:  6


Gait Assistive Device:  FWW


Does the Pt use WC or Scooter?:  No


Stairs (FIM):  5


# of Steps:  8 (household distance)


1 Step (curb) (QC):  6


4 Steps (QC):  6


12 Steps (QC):  88 (no goal set; not indicated)


Stairs Level Of Assist:  6


Picking up an Object (QC):  5 (supervision)





PT Plan


Treatment/Plan


Treatment Plan:  Continue Plan of Care


Treatment Plan:  Bed Mobility, Education, Functional Activity Evans, Functional 

Strength, Group Therapy, Gait, Safety, Therapeutic Exercise, Transfers


Treatment Duration:  2017


Visits Per Week:  10-15


Minutes/Day (M-F):  60-90


Minutes/Day (Sat/Cardona):  prn





Safety Risks/Education


Patient Education:  Gait Training, Transfer Techniques


Teaching Recipient:  Patient


Teaching Methods:  Demonstration, Discussion


Response to Teaching:  Verbalize Understanding, Return Demonstration, 

Reinforcement Needed





Time/GCodes


Time In:  1005


Time Out:  1035


Total Billed Treatment Time:  30


Total Billed Treatment


1,EX10,GT15


G Codes Necessary:  GANESH Keenan PTA 2017 11:11

## 2017-02-05 VITALS — DIASTOLIC BLOOD PRESSURE: 71 MMHG | SYSTOLIC BLOOD PRESSURE: 138 MMHG

## 2017-02-05 VITALS — SYSTOLIC BLOOD PRESSURE: 125 MMHG | DIASTOLIC BLOOD PRESSURE: 67 MMHG

## 2017-02-05 VITALS — SYSTOLIC BLOOD PRESSURE: 138 MMHG | DIASTOLIC BLOOD PRESSURE: 71 MMHG

## 2017-02-05 VITALS — SYSTOLIC BLOOD PRESSURE: 149 MMHG | DIASTOLIC BLOOD PRESSURE: 70 MMHG

## 2017-02-05 RX ADMIN — Medication SCH MG: at 06:44

## 2017-02-05 RX ADMIN — DOCUSATE SODIUM AND SENNOSIDES SCH EA: 8.6; 5 TABLET, FILM COATED ORAL at 08:59

## 2017-02-05 RX ADMIN — Medication SCH MG: at 17:01

## 2017-02-05 RX ADMIN — Medication SCH EA: at 06:44

## 2017-02-05 RX ADMIN — LEVOTHYROXINE SODIUM SCH MCG: 125 TABLET ORAL at 06:44

## 2017-02-05 RX ADMIN — OMEGA-3 FATTY ACIDS CAP 1000 MG SCH MG: 1000 CAP at 06:44

## 2017-02-05 RX ADMIN — ASPIRIN SCH MG: 81 TABLET ORAL at 09:02

## 2017-02-05 RX ADMIN — DOCUSATE SODIUM AND SENNOSIDES SCH EA: 8.6; 5 TABLET, FILM COATED ORAL at 21:13

## 2017-02-06 VITALS — DIASTOLIC BLOOD PRESSURE: 76 MMHG | SYSTOLIC BLOOD PRESSURE: 134 MMHG

## 2017-02-06 VITALS — DIASTOLIC BLOOD PRESSURE: 68 MMHG | SYSTOLIC BLOOD PRESSURE: 150 MMHG

## 2017-02-06 RX ADMIN — Medication SCH MG: at 16:59

## 2017-02-06 RX ADMIN — OMEGA-3 FATTY ACIDS CAP 1000 MG SCH MG: 1000 CAP at 06:37

## 2017-02-06 RX ADMIN — DOCUSATE SODIUM AND SENNOSIDES SCH EA: 8.6; 5 TABLET, FILM COATED ORAL at 09:48

## 2017-02-06 RX ADMIN — LEVOTHYROXINE SODIUM SCH MCG: 125 TABLET ORAL at 06:37

## 2017-02-06 RX ADMIN — DOCUSATE SODIUM AND SENNOSIDES SCH EA: 8.6; 5 TABLET, FILM COATED ORAL at 19:44

## 2017-02-06 RX ADMIN — Medication SCH EA: at 06:37

## 2017-02-06 RX ADMIN — Medication SCH MG: at 06:37

## 2017-02-06 RX ADMIN — ASPIRIN SCH MG: 81 TABLET ORAL at 09:48

## 2017-02-06 NOTE — PM & R (SOAP) PROGRESS NOTE
Subjective


Subjective/Events-last exam


Patient was seen in her room this evening Patient min assist for transfers 

Appreciate ST/OT/PT notes


Review of Systems


Neurological:  : Confusion: Weakness





Objective


Exam


Last Set of Vital Signs





Vital Signs








  Date Time  Temp Pulse Resp B/P Pulse Ox O2 Delivery O2 Flow Rate FiO2


 


2/6/17 17:45 98.9 78 18 150/68 95 Room Air  


 


2/6/17 16:34       2.00 





Capillary Refill : Less Than 3 Seconds


I&O











 Intake and Output 


 


 2/6/17





 00:00


 


Intake Total 250 ml


 


Balance 250 ml


 


 


 


Intake Oral 200 ml


 


Other 50 ml


 


# Voids 2








General:  Alert, Cooperative, No Acute Distress


HEENT:  Atraumatic, PERRLA, EOMI, Mucous Memb Moist/Pink


Neck:  Supple, No JVD


Lungs:  Clear to Auscultation


Heart:  Regular Rate


Abdomen:  Normal Bowel Sounds, Soft, No Tenderness


Extremities:  Other (trace edema left ankle)


Neuro:  Other (Impaired strength prox left hip Cognition mildly impaired)





Results


Lab


 Laboratory Tests


2/4/17 06:16: 


Hematocrit 21L, Hemoglobin 6.8*L


2/5/17 10:25: 


Hematocrit 27L, Hemoglobin 8.7#L





Assessment/Plan


Assessment


Fall /frx left hip s/p repair DR Garza WBAT


Prior fall with rt hip frx repair


Mild dementia


Presumed osteoporosis


Depression 


Constipation


Hypothyroidism





Plan


Continue PT/OT/ST


Team Conference 2/8/17


F/U with Hospitalist and ortho EULA VALENTIN MD Feb 6, 2017 20:17

## 2017-02-06 NOTE — OCCUPATIONAL THER DAILY NOTE
OT Current Status-Daily Note


Subjective


Pt seen in room, up in w/c, agreeable to OT. No pain mentioned





Appearance


Alert, cooperative. Quiet, flat affect





Mental Status/Objective


Patient Orientation:  Person, Place, Time, Situation


              Functional [place] Measure


0=Not Assessed/NA   4=Minimal Assistance


1=Total Assistance   5=Supervision or Setup


2=Maximal Assistance   6=Modified [place]


3=Moderate Assistance   7=Complete [place]





ADL-Treatment


Pt declined bathing or changing clothes. Agreed to grooming.


              Functional [place] Measure


0=Not Assessed/NA   4=Minimal Assistance


1=Total Assistance   5=Supervision or Setup


2=Maximal Assistance   6=Modified [place]


3=Moderate Assistance   7=Complete IndependenceIRFPAI Quality Coding Scale











6 Independent with activity with or without an assistive device


 


5  Patient requires set up or clean up by helper.  Patient completes activity  

by  themselves


 


4 Supervision or touching assist (CGA). [place] provide cues , steadying assist


 


3 The helper provides less than half the effort to complete the activity


 


2 The helper provides more than half the effort to complete the activity


 


1 Dependent.  The helper does all the effort to complete an activity 


 


7 Patient refused to complete or attempt activity


 


9 The patient did not perform the activity before the current illness or injury


 


88 Not attempted due to Medical conditions or safety concerns








Grooming (FIM):  5 (setup to clean dentures and mouth, wash face and hands, w/c 

level, at sink. setup to brush hair)





Other Treatment


Pt transported per w/c to gym. Did 12 minutes bilat UE exercise on arm bike set 

at 10W resistance. Also did arc activity x 2 sets with short extension and 1 

set at medium extension, nuts and bolts activity, all without additional 

resistance to arms. All for strengthening arms to assist with transfers and 

standing for ADLs. Pt returned to room, needing min assist to push up to stand 

and then to walk to recliner, FWW. pt left up in recliner, all needs met.





Education


OT Patient Education:  Exercise program, Progress toward Goal/Update tx plan, 

Transfer techniques


Teaching Recipient:  Patient


Teaching Methods:  Discussion


Response to Teaching:  Verbalize Understanding





OT Short Term Goals


Short Term Goals


Time Frame:  Feb 17, 2017


Bathing(FIM):  5


Toileting(FIM):  5


Toilet/Commode Transfer(FIM):  5


Shower Transfer(FIM):  5


Additional Short Term Goals:  2-Verbalize Understanding, 3-ImproveStrength/[place]


1=Demonstrate adherence to instructed precautions during ADL tasks.


2=Patient will verbalize/demonstrate understanding of assistive devices/

modifications for ADL.


3=Patient will improve strength/tolerance for activity to enable patient to 

perform ADL's.





OT Long Term Goals


Long Term Goals


Time Frame:  Feb 24, 2017


Eating (FIM):  6


Eating (QC):  6


Oral Hygiene (QC):  6


Grooming(FIM):  6


Bathing(FIM):  6


Shower/Bathe Self (QC):  6


Upper Body Dressing(FIM):  6


Upper Body Dressing (QC):  6


Lower Body Dressing(FIM):  6


Lower Body Dressing (QC):  6


On/Off Footwear (QC):  6


Toileting(FIM):  6


Toileting Hygiene (QC):  6


Toilet/Commode Transfer(FIM):  6


Toilet/Commode Transfer (QC):  6


Comprehension(FIM):  4


Expression (FIM):  5


Social Interaction(FIM):  5


Problem Solving(FIM):  4


Memory(FIM):  4


Additional Goals:  2-Verbalize Understanding, 3-ImproveStrength/[place]


1=Demonstrate adherence to instructed precautions during ADL tasks.


2=Patient will verbalize/demonstrate understanding of assistive devices/

modifications for ADL.


3=Patient will improve strength/tolerance for activity to enable patient to 

perform ADL's.





OT Education/Plan


Problem List/Assessment


Pt would benefit from skilled OT to increase her independence in basic self 

care to allow her to safely return to her home to live independently and to 

decrease caregiver burden





Discharge Recommendations


Plan/Recommendations:  Continue POC





Treatment Plan/Plan of Care


Patient would benefit from OT for education, treatment and training to promote 

independence in ADL's, mobility, safety and/or upper extremity function for ADL'

s.


Plan of Care:  ADL Retraining, Functional Mobility, Group Exercise/Act as Ind (

education, exercise, activity tolerance, memory, socialization, functional 

activities), UE Funct Exercise/Act, UE Neuromus Re-Ed/Coord


Treatment Duration:  Feb 24, 2017


Visits Per Week:  10-11


Minutes/Day (M-F):  75-90


Minutes/Day (Sat/Cardona):  PRN


Agreement:  Yes


Rehab Potential:  Fair





Time/GCodes


Start Time:  11:00


Stop Time:  11:45


Total Time Billed (hr/min):  45


Billed Treatment Time


visit, 15 minutes ADL, 30 minutes exercise








NOMAN YOUNG OT Feb 6, 2017 13:49

## 2017-02-06 NOTE — THERAPY GROUP DAILY NOTE
Therapy Daily Group Note


Patient Education Topic


Other List Below





Exercises


LE Seated Exercise, ROM, UE Exercise





Other/Notes


Pt. seen this date for group therapy.  Pt. participated in memory/socialization 

exercises with focus to remember three things about other members of group.  

Pt. then participated in education regarding ways to keep memory activity 

during advancing age.  Pt. engaged well and tolerated this well.  Very social 

during group.  Pt. also participated in group activity with memory game, in 

which pts. were asked to remember where specific pictures were.  Pt. tolerated 

this as well.  All needs met back in room.





1, GRP


Start Time:  13:00


Stop Time:  14:05


Total Billed Treatment Time:  65


Total Billed Treatment


1, GRP








ISABEL MATTA OT Feb 6, 2017 15:11

## 2017-02-06 NOTE — HISTORY AND PHYSICAL
DATE OF ADMISSION: 02/03/2017 

 

CHIEF COMPLAINT:

Difficulty with walking. 

 

HISTORY OF PRESENT ILLNESS:

The patient is an 84-year-old female who lives alone in [place], [place] but has 2 sisters nearby that help who help out, who had

been modified independent with a walker, who fell at home and

sustained a left hip fracture. She was admitted to [facility]  to the service of Dr. Pandya,

hospitalist on 02/01 and consult to Orthopedics was made. She was seen by Dr. Garza who had done a

prior hip procedure on her years ago.  The patient underwent

repair and the patient is now referred to Inpatient

Rehabilitation Unit.  She has a mild postop fever, hemoglobin was

7.2. She is on replacement. Serum was iron at 202. She requires

assistance for ADLs and mobility skills and is now referred to

Inpatient Rehabilitation Unit. 

 

PAST MEDICAL HISTORY:

1.   Right hip fracture 2004 with repair, Dr. Garza.  

2.   Presumed osteoporosis.  

3.    Depression.  

4.   Constipation. 

5.   She had some mild postoperative delirium, but still has not

cleared entirely and there may be some mild baseline dementia or

cognitive impairment. 

6.   Hypothyroidism. 

 

PAST SURGICAL HISTORY:

As per above. 

 

ALLERGIES:

Penicillin. 

 

FAMILY HISTORY:

Noncontributory. 

 

SOCIAL HISTORY:

She lives alone, retired from [employer], has supportive family nearby, lives in

a house in [place], [place]. PCP is Dr. Mehta but he is

relocating. 

 

REVIEW OF SYSTEMS:

Ten-point review of systems significant for some mildly impaired

memory. History of falls and depression. 

 

MEDICATIONS:

1.   Celexa 20 mg p.o. daily. 

2.    mg p.o. daily. 

3.   Multivitamins with minerals 1 tablet p.o. daily.  

4.   Fish oil 1000 mg p.o. daily. 

5.   Levothyroxine 125 mcg p.o. daily.  

6.   Senokot-S 1 tablet p.o. b.i.d. 

7.   Niferex 150 mg p.o. b.i.d. with meals. 

8.   Calcium carbonate with vitamin D 600 mg p.o. b.i.d. 

9.   Tramadol 50 mg p.o. q.6 hours p.r.n. moderate pain. 

 

PHYSICAL EXAMINATION: Significant for a pleasant, elderly female,

appearing her stated age, alert and oriented in no acute

distress. 

VITAL SIGNS: Temperature is 99.5, pulse 76, respirations 18,

blood pressure 114/58. O2 sat 96% on O2 by nasal cannula at 2

liters which is new. She does not have oxygen at home. 

HEENT: Vision, speech, hearing, grossly intact. No oral lesion is

noted. 

NECK: Supple without mass. 

HEART: Regular rhythm. 

LUNGS: Clear. 

ABDOMEN: Soft. Nontender.  Bowel sounds present.  

EXTREMITIES: Trace edema, left ankle. No calf tenderness. 

MUSCULOSKELETAL: The patient has functional strength and active

range of motion in both upper extremities and right lower

extremity.  Left lower extremity is limited at the hip due to

recent fracture and repair.

Neurological

[place] mildly impaired in thumbs with tingling which patient relates to CTS 
Distal strength and sensation grossly

intact to touch. Cognition mildly slowed.Strength BUEs and RLE 4/5 LLE distal 
strenght good Limited at left hip due to recent surgery for hip frx 

 

IMPRESSION:

1.   Ambulatory dysfunction secondary to fall with fracture with

nondisplaced intertrochanteric fracture of the left hip, status

post repair, Dr. Garza, weight-bearing as tolerated to

02/02/2017. 

2.   Prior fall, fracture, right hip remote, repair, Dr. Garza. 

3.   Hypothyroidism on replacement. 

4.   Postoperative anemia on replacement. 

5.   Postoperative respiratory insufficiency on supplemental 02. 

6.   Depression, on medication. 

7.   Presumed osteoporosis, on calcium and vitamin D. 

8.   Postop delirium with probable baseline mild dementia 

PLAN:

The patient will have a comprehensive program to inpatient orthopedic

rehabilitation with goal of maximizing level of functional

independence prior to discharge home with family and home health

care.  The patient will have a PT/OT 90 minutes per day, each

discipline when not being seen by speech therapy for gait

strengthening, conditioning, balance, ADLs, any

patient/family/caregiver training necessary, any adaptive

equipment and training necessary. Speech therapy do cognitive

assessment and treat as indicated. Rehabilitation nursing assist

with bowel, bladder, skin, wound care, medication administration,

pain management, reorientation as needed.  to

assist with discharge planning, community reentry. Respiratory

therapy to assist with O2 administration and weaning as able.

Patient fall and orthopedic precautions. Follow-up with Dr. Pandya and Greg as per their schedules. 

 

ESTIMATED LENGTH OF STAY:   

2 to 3 weeks. 

 

PROGNOSIS:

Rehab prognosis appears good for goal of discharging to home with

home health care and family, modified independent to supervision

for ADLs and mobility skills.  It may be that she would be more

appropriate for her to assisted living setting upon discharge or

in the near future. 

 

DIET:

Regular. 

 

CODE STATUS: 

Full code. 

 

POST ADMISSION PHYSICIAN ASSESSMENT:  

The post admission physician assessment agrees with the

preadmission screen that the patient is a good candidate for

inpatient rehabilitation.  She appears to be well motivated to

participate in 3 hours in therapy a day. She should be able to

tolerate 3 hours of therapy a day. She should benefit from the 3

hours of therapy a day. She has reasonable discharge plan,

reasonable discharge rehabilitation goals and a supportive

family. She has various comorbidities that need to be closely

monitored with medications and treatments adjusted daily basis as

needed. These include her postop anemia and postoperative

respiratory insufficiency.  

 

BARRIERS TO DISCHARGE:   

Barriers to discharge for this patient who had been modified

independent living alone prior to this, are for her to be

modified independent to supervision for ADLs and mobility skills

prior to discharge home and hopefully weaned from supplemental

02, so as to lessen the burden of the caregivers. 

 

RISKS FOR THIS PATIENT: 

Include: 

1.   Worsening respiratory insufficiency. 

2.   Fall. 

3.   Fracture. 

4.   DVT. 

5.   Pulmonary embolism. 

6.   Urinary retention. 

7.   UTI. 

8.   Respiratory infection. 

9.   Aspiration. 

10.  Will utilize SCDs for DVT prophylaxis.  

 

 

 

 

Job ID: [id number] 

Dictated Date: 02/03/2017 13:13:01 

Transcription Date: 02/06/2017 01:18:22/singh NARANJO

## 2017-02-06 NOTE — PHYSICAL THERAPY DAILY NOTE
PT Daily Note-Current


Subjective


Patient in recliner pre tx, agrees reluctantly to PT, states she has pain of 6/

10 in left hip.





Appearance


Patient in wheelchair at bedside post tx, has nurse call, phone, tray, all 

needs met.





Mental Status


Patient Orientation:  Person, Place, Situation





Transfers


              Functional [place] Measure


0=Not Assessed/NA   4=Minimal Assistance


1=Total Assistance   5=Supervision or Setup


2=Maximal Assistance   6=Modified [place]


3=Moderate Assistance   7=Complete IndependenceIRFPAI Quality Coding Scale











6 Independent with activity with or without an assistive device


 


5  Patient requires set up or clean up by helper.  Patient completes activity  

by  themselves


 


4 Supervision or touching assist (CGA). [place] provide cues , steadying assist


 


3 The helper provides less than half the effort to complete the activity


 


2 The helper provides more than half the effort to complete the activity


 


1 Dependent.  The helper does all the effort to complete an activity 


 


7 Patient refused to complete or attempt activity


 


9 The patient did not perform the activity before the current illness or injury


 


88 Not attempted due to Medical conditions or safety concerns








Transfers (B, C, W/C) (FIM):  4


Sit to/from Stand:  4 (CGA)


Bed to/from Chair:  4 (CGA)


cues for safety and hand placement





Gait Training


Gait (FIM):  2


Distance:  75'x2


Gait Level of Assist:  4


Gait Persons Needed:  1


Gait Assistive Device:  FWW


very slow, antalgic, decreased stance time on left leg





Exercises


Standing:  Hip Abduction, Hamstring curls, Heel/toe raises, Mini squats


Standing Reps:  20


NuStep Minutes:  15


 NuStep Workload:  3





Treatments


transfers, ambulation, functional strengthening





Assessment


Current Status:  Fair Progress


improved ambulation/distance





PT Short Term Goals


Short Term Goals


Time Frame:  2017


Gait (FIM):  4


Distance (FIM):  3=150 ft


Gait Assistive Device:  FWW


Stairs (FIM):  2


# of Steps:  4





PT Long Term Goals


Long Term Goals


PT Long Term Goals Time Frame:  2017


Transfers (B,C,W/C) (FIM):  7


Sit to Lying (QC):  6


Lying-Sitting on Side/Bed(QC):  6


Sit to Stand (QC):  6


Rollin


Roll Left to Right (QC):  6


Chair/Bed-to-Chair Xfer(QC):  6


Car Transfer (QC):  5


Does the Patient Walk:  Yes


Gait (FIM):  6


Gait distance (FIM):  3=150 ft


Walk 10 feet (QC):  6


Walk 10ft-Uneven Surface(QC):  6


Walk 50ft with 2 Turns (QC):  6


Walk 150 ft (QC):  6


Gait Level of Assist:  6


Gait Assistive Device:  FWW


Does the Pt use WC or Scooter?:  No


Stairs (FIM):  5


# of Steps:  8 (household distance)


1 Step (curb) (QC):  6


4 Steps (QC):  6


12 Steps (QC):  88 (no goal set; not indicated)


Stairs Level Of Assist:  6


Picking up an Object (QC):  5 (supervision)





PT Plan


Problem List


Problem List:  Activity Tolerance, Functional Strength, Safety, Balance, Gait, 

Transfer, Bed Mobility, ROM





Treatment/Plan


Treatment Plan:  Continue Plan of Care


Treatment Plan:  Bed Mobility, Education, Functional Activity [place], Functional 

Strength, Group Therapy, Gait, Safety, Therapeutic Exercise, Transfers


Treatment Duration:  2017


Visits Per Week:  10-15


Minutes/Day (M-F):  60-90


Minutes/Day (Sat/Cardona):  prn





Safety Risks/Education


Patient Education:  Gait Training, Transfer Techniques, Safety Issues


Teaching Recipient:  Patient


Teaching Methods:  Demonstration, Discussion


Response to Teaching:  Reinforcement Needed





Time/GCodes


Time In:  1000


Time Out:  1100


Total Billed Treatment Time:  60


Total Billed Treatment


1 visit


EX 30 min


GT 30 min








SCARLET ASKEW PT 2017 10:59

## 2017-02-06 NOTE — INDIVIDUALIZED PLAN OF CARE
Individualized Plan of Care


Rehab Nursing IPOC Order


Admission Date


Feb 3, 2017 at 10:20


Current Orders





 Orders-EULA MARTINEZ MD


Rt Request For Service (2/5/17 08:58)


Hemoglobin And Hematocrit (2/5/17 09:57)


Patient Visit (2/6/17 )


Treat. Speech/Lang/Voice (2/6/17 )


Patient Visit (2/6/17 )


Gait Training, Ea 15 Min (2/6/17 )


Exercise Therap, Ea 15 Min (2/6/17 )


Patient Visit (2/6/17 )


Cbc With Automated Diff (2/7/17 06:00)


Comprehensive Metabolic Panel (2/7/17 06:00)





PT IPOC


Problem List:  Activity Tolerance, Functional Strength, Safety, Balance, Gait, 

Transfer, Bed Mobility, ROM


Treatment Plan:  Continue Plan of Care


Bed Mobility, Education, Functional Activity Evans, Functional Strength, Group 

Therapy, Gait, Safety, Therapeutic Exercise, Transfers


Treatment Duration:  Feb 24, 2017


Visits Per Week:  10-15


Minutes/Day (M-F):  60-90


Minutes/Day (Sat/Cardona):  prn





OT IPOC


Problems:  Decreased Activ Tolerance, Decreased Safety Aware, Decreased UE 

Strength, Dependent Transfers, Impaired Funct Balance, Impaired Self-Care Skills


OT Problems


Pt would benefit from skilled OT to increase her independence in basic self 

care to allow her to safely return to her home to live independently and to 

decrease caregiver burden


Plan of Care:  ADL Retraining, Functional Mobility, Group Exercise/Act as Ind (

education, exercise, activity tolerance, memory, socialization, functional 

activities), UE Funct Exercise/Act, UE Neuromus Re-Ed/Coord


Treatment Duration:  Feb 24, 2017


Visits Per Week:  10-11


Minutes/Day (M-F):  75-90


Minutes/Day (Sat/Cardona):  PRN





ST IPOC


Speech Therapy Treatment Plan:  Continue Plan of Care


Treatment Duration:  Feb 24, 2017


Visits Per Week:  Four to Five


Minutes/Day (M-F):  30





Physician IPOC


Medical Issues being managed closely and that require the 24 hour availability 

of a physician:Postop anemia and confusion and constipation


Medical Issues:  Bowel/Bladder Function, DVT Prophylaxis, Falls Precautions, 

Fluid/Electrolyte/Nutrition Balance, Infection Protection, Pain Management, 

Wound Care, Other (List) (as per above)


Brief Synthesis of Preadmission Screen, Post-Admission Evaluation, and Therapy 


Evaluations: 85 yo female who had been living alone with family nearby who fell 

and sustained a left hip frx Had repair with ortho and over weekend required a 

transfusion of PRBS Post transfusion HBG >8Hospitalist service following this 

Out of town patient Has some post op confusion as well and ST following as well


Medical Prognosis:  good


Anticipated Length of Stay:  2/24/17


Rehab Goals


Modified Independent to supervision for adls and mobility skills


Anticipated discharge destinat:  Home with [facility] and family








EULA MARTINEZ MD Feb 6, 2017 20:24

## 2017-02-06 NOTE — SPEECH THERAPY DAILY NOTE
Speech Daily Progress Note


Subjective


The patient was seated upright in chair upon entrance. The patient greeted the 

clinician appropriately and agreed to participate in the cognitive session on 

this date.





Objective


- Functional Memory Strategies: External and internal functional memory 

strategies were discussed and demonstrated on this date. Per patient, she keeps 

a calendar and address book where she records her upcoming appointments. The 

patient stated the calendar is kept on a card table she checks daily. 

Additionally, the patient uses a pill box for medication. The patient reported 

she fills the pill box on the same day each week.





- As the patient appeared to demonstrate improvement since the evaluation, a 

repeat of the MoCA was administered. The patient demonstrated a result of +17/

30 (moderate) in comparison to her initial score of +8/30 (severe). The patient 

continued to demonstrate difficulties with executive functioning, immediate and 

delayed recall, and problem solving.





Assessment


Assessment Current Status:  Fair Progress





Treatment Plan


Continue Plan of Care





Communication


Comprehension:  4


Expression:  4





Social Cognition


Social Interaction:  4


Problem Solving:  3


Memory:  2





Speech Short Term Goals


Short Term Goals


Short Term Goals


1. The patient will recall and demonstrate three functional memory strategies.


2. The patient will display 80% accuracy with safety problem solving with mild 

clinician cueing.


3. The patient will demonstrated 90% accuracy (independently) with simple 

orientation information.


Time Frame-STG:  Two Weeks





Speech Long Term Goals


Long Term Goals


1. The patient will demonstrate improved cognition for increased safety and 

function with ADL's in the least restrictive setting.


Time Frame:  Three Weeks


Comprehension:  4


Expression:  5


Social Interaction:  5


Problem Solvin


Memory:  4





Speech-Plan


Treatment Plan


Speech Therapy Treatment Plan:  Continue Plan of Care


Cognitive therapy to focus on functional memory strategies and safety problem 

solving.


Treatment Duration:  2017


# of days/week


Four to Five


Visits Per Week:  Four to Five


Minutes/Day (M-F):  30


Rehab Potential:  Fair





Safety Risks/Education


Teaching Recipient:  Patient


Teaching Methods:  Demonstration, Handout, Discussion


Response to Teaching:  Reinforcement Needed


Education Topics Provided:  


Internal and External Memory Strategies





Time


Speech Therapy Time In:  08:30


Speech Therapy Time Out:  09:00


Total Billed Time:  30


Billed Treatment Time


AVA Gillespie [address] 2017 09:42

## 2017-02-07 VITALS — DIASTOLIC BLOOD PRESSURE: 67 MMHG | SYSTOLIC BLOOD PRESSURE: 119 MMHG

## 2017-02-07 VITALS — DIASTOLIC BLOOD PRESSURE: 76 MMHG | SYSTOLIC BLOOD PRESSURE: 160 MMHG

## 2017-02-07 LAB
ALBUMIN SERPL-MCNC: 3.1 G/DL (ref 3.2–4.5)
ALT SERPL-CCNC: 34 U/L (ref 0–55)
ANION GAP SERPL CALC-SCNC: 8 MMOL/L (ref 5–14)
AST SERPL-CCNC: 20 U/L (ref 5–34)
BASOPHILS # BLD AUTO: 0 10^3/UL (ref 0–0.1)
BASOPHILS NFR BLD AUTO: 1 % (ref 0–10)
BILIRUB SERPL-MCNC: 1.1 MG/DL (ref 0.1–1)
BUN SERPL-MCNC: 14 MG/DL (ref 7–18)
BUN/CREAT SERPL: 21
CALCIUM SERPL-MCNC: 8.1 MG/DL (ref 8.5–10.1)
CHLORIDE SERPL-SCNC: 103 MMOL/L (ref 98–107)
CO2 SERPL-SCNC: 27 MMOL/L (ref 21–32)
CREAT SERPL-MCNC: 0.66 MG/DL (ref 0.6–1.3)
EOSINOPHIL # BLD AUTO: 0.7 10^3/UL (ref 0–0.3)
EOSINOPHIL NFR BLD AUTO: 11 % (ref 0–10)
ERYTHROCYTE [DISTWIDTH] IN BLOOD BY AUTOMATED COUNT: 14.7 % (ref 10–14.5)
GFR SERPLBLD BASED ON 1.73 SQ M-ARVRAT: > 60 ML/MIN
GLUCOSE SERPL-MCNC: 92 MG/DL (ref 70–105)
LYMPHOCYTES # BLD AUTO: 1.7 X 10^3 (ref 1–4)
LYMPHOCYTES NFR BLD AUTO: 26 % (ref 12–44)
MCH RBC QN AUTO: 30 PG (ref 25–34)
MCHC RBC AUTO-ENTMCNC: 32 G/DL (ref 32–36)
MCV RBC AUTO: 93 FL (ref 80–99)
MONOCYTES # BLD AUTO: 1 X 10^3 (ref 0–1)
MONOCYTES NFR BLD AUTO: 15 % (ref 0–12)
NEUTROPHILS # BLD AUTO: 3 X 10^3 (ref 1.8–7.8)
NEUTROPHILS NFR BLD AUTO: 47 % (ref 42–75)
PLATELET # BLD: 246 10^3/UL (ref 130–400)
PMV BLD AUTO: 9.9 FL (ref 7.4–10.4)
POTASSIUM SERPL-SCNC: 3.8 MMOL/L (ref 3.6–5)
PROT SERPL-MCNC: 5 G/DL (ref 6.4–8.2)
RBC # BLD AUTO: 3.02 10^6/UL (ref 4.35–5.85)
SODIUM SERPL-SCNC: 138 MMOL/L (ref 135–145)
WBC # BLD AUTO: 6.3 10^3/UL (ref 4.3–11)

## 2017-02-07 RX ADMIN — Medication SCH MG: at 17:38

## 2017-02-07 RX ADMIN — LEVOTHYROXINE SODIUM SCH MCG: 125 TABLET ORAL at 06:17

## 2017-02-07 RX ADMIN — ASPIRIN SCH MG: 81 TABLET ORAL at 08:43

## 2017-02-07 RX ADMIN — DOCUSATE SODIUM AND SENNOSIDES SCH EA: 8.6; 5 TABLET, FILM COATED ORAL at 19:26

## 2017-02-07 RX ADMIN — Medication SCH EA: at 07:11

## 2017-02-07 RX ADMIN — Medication SCH MG: at 07:11

## 2017-02-07 RX ADMIN — OMEGA-3 FATTY ACIDS CAP 1000 MG SCH MG: 1000 CAP at 07:11

## 2017-02-07 RX ADMIN — DOCUSATE SODIUM AND SENNOSIDES SCH EA: 8.6; 5 TABLET, FILM COATED ORAL at 08:43

## 2017-02-07 NOTE — PROGRESS NOTE-HOSPITALIST
Progress Note


Progress Notes/Assess & Plan


Date Seen


2/7/17


Diagonsis/Assessment & Plan


Patient doing well and hemoglobin is stable since receiving 2 units of blood


She appears to not remember who I was today on interview even though I seen her 

4 times


Denies any confusion or severe pain but does receive pain medication 

periodically


Bowels are moving yesterday





No fever, vital signs stable, pleasant, frail, pale, poor recall


Regular rate and rhythm, clear to auscultation bilaterally


No edema





Laboratory Tests


2/7/17 05:15








Assessment:


Status post hip fracture repair


Severe debility


Postop anemia requiring 2 units of packed red blood cell transfusion


Cognitive deficit noted poor recall


Hypothyroidism


Depression





Maintain home medication


Monitor labs


Monitor vitals








JUSTO BRADSHAW DO Feb 7, 2017 11:33

## 2017-02-07 NOTE — PHYSICAL THERAPY DAILY NOTE
PT Daily Note-Current


Subjective


Pt. in bed, agrees to Rx, needs to toilet and order some food, agrees to 

walking etc





Pain





   Numeric Pain Scale:  5-Moderate Pain


   Location:  Left


   Location Body Site:  Hip


   Pain Description:  Ache





Transfers


              Functional [place] Measure


0=Not Assessed/NA   4=Minimal Assistance


1=Total Assistance   5=Supervision or Setup


2=Maximal Assistance   6=Modified [place]


3=Moderate Assistance   7=Complete IndependenceIRFPAI Quality Coding Scale











6 Independent with activity with or without an assistive device


 


5  Patient requires set up or clean up by helper.  Patient completes activity  

by  themselves


 


4 Supervision or touching assist (CGA). [place] provide cues , steadying assist


 


3 The helper provides less than half the effort to complete the activity


 


2 The helper provides more than half the effort to complete the activity


 


1 Dependent.  The helper does all the effort to complete an activity 


 


7 Patient refused to complete or attempt activity


 


9 The patient did not perform the activity before the current illness or injury


 


88 Not attempted due to Medical conditions or safety concerns








SBA OOB,





Gait Training


Does the Patient Walk?:  Yes


Gait (FIM):  2


Distance (FIM):  6=074-20 ft (100x2)


Gait Level of Assist:  4


Gait Persons Needed:  1


Gait Assistive Device:  FWW





Exercises


Supine Ex:  Ankle pumps, Quad Set, Heel Slides, Hip abd/add


Supine Reps:  8





Treatments


toileted managing pants and clean up indep/SBA, up in chair with bell bell, 

ordering meal





Assessment


Current Status:  Good Progress


flat affect, slow moving





PT Short Term Goals


Short Term Goals


Time Frame:  2017


Gait (FIM):  4


Distance (FIM):  3=150 ft


Gait Assistive Device:  FWW


Stairs (FIM):  2


# of Steps:  4





PT Long Term Goals


Long Term Goals


PT Long Term Goals Time Frame:  2017


Transfers (B,C,W/C) (FIM):  7


Sit to Lying (QC):  6


Lying-Sitting on Side/Bed(QC):  6


Sit to Stand (QC):  6


Rollin


Roll Left to Right (QC):  6


Chair/Bed-to-Chair Xfer(QC):  6


Car Transfer (QC):  5


Does the Patient Walk:  Yes


Gait (FIM):  6


Gait distance (FIM):  3=150 ft


Walk 10 feet (QC):  6


Walk 10ft-Uneven Surface(QC):  6


Walk 50ft with 2 Turns (QC):  6


Walk 150 ft (QC):  6


Gait Level of Assist:  6


Gait Assistive Device:  FWW


Does the Pt use WC or Scooter?:  No


Stairs (FIM):  5


# of Steps:  8 (household distance)


1 Step (curb) (QC):  6


4 Steps (QC):  6


12 Steps (QC):  88 (no goal set; not indicated)


Stairs Level Of Assist:  6


Picking up an Object (QC):  5 (supervision)





PT Plan


Treatment/Plan


Treatment Plan:  Continue Plan of Care


Treatment Plan:  Bed Mobility, Education, Functional Activity Evans, Functional 

Strength, Group Therapy, Gait, Safety, Therapeutic Exercise, Transfers


Treatment Duration:  2017


Visits Per Week:  10-15


Minutes/Day (M-F):  60-90


Minutes/Day (Sat/Cardona):  prn





Safety Risks/Education


Patient Education:  Gait Training, Transfer Techniques


Teaching Recipient:  Patient


Teaching Methods:  Demonstration, Discussion


Response to Teaching:  Verbalize Understanding, Return Demonstration, 

Reinforcement Needed





Time/GCodes


Time In:  1500


Time Out:  1520


Total Billed Treatment Time:  20


Total Billed Treatment


1,GT20m


G Codes Necessary:  GANESH Keenan PTA 2017 15:24

## 2017-02-07 NOTE — SPEECH THERAPY DAILY NOTE
Speech Daily Progress Note


Subjective


The patient was seated upright in bed upon entrance. The patient greeted the 

clinician appropriately and agreed to participate in the cognitive session on 

this date.





Objective


Functional Safety Problem Solving: The patient demonstrated fair to good 

accuracy (70%) with functional safety problem solving with mild to moderate 

clinician verbal cueing. The patient was provided a picture depicting a safety 

issue in the environment. The patient was asked to identify the safety issue 

and provide and appropriate solution.





Assessment


Assessment Current Status:  Fair Progress





Treatment Plan


Continue Plan of Care





Communication


Comprehension:  3


Expression:  4





Social Cognition


Social Interaction:  4


Problem Solvin


Memory:  2





Speech Short Term Goals


Short Term Goals


Short Term Goals


1. The patient will recall and demonstrate three functional memory strategies.


2. The patient will display 80% accuracy with safety problem solving with mild 

clinician cueing.


3. The patient will demonstrated 90% accuracy (independently) with simple 

orientation information.


Time Frame-STG:  Two Weeks





Speech Long Term Goals


Long Term Goals


1. The patient will demonstrate improved cognition for increased safety and 

function with ADL's in the least restrictive setting.


Time Frame:  Three Weeks


Comprehension:  4


Expression:  5


Social Interaction:  5


Problem Solvin


Memory:  4





Speech-Plan


Treatment Plan


Speech Therapy Treatment Plan:  Continue Plan of Care


Continue skilled cognitive therapy for improved safety and function in least 

restrictive environment.


Treatment Duration:  2017


# of days/week


Four to five


Visits Per Week:  Four to Five


Minutes/Day (M-F):  30


Rehab Potential:  Fair





Safety Risks/Education


Teaching Recipient:  Patient


Teaching Methods:  Discussion


Response to Teaching:  Reinforcement Needed


Education Topics Provided:  


Safety Problem Solving





Time


Speech Therapy Time In:  08:30


Speech Therapy Time Out:  09:00


Total Billed Time:  30


Billed Treatment Time


1AVA [address] ST 2017 13:33

## 2017-02-07 NOTE — PM & R (SOAP) PROGRESS NOTE
Subjective


Subjective/Events-last exam


Patient was seen in her room this afternoon Appreciate DR Bryant note 

appreciate current therapy notes and labs Patient min assist for 

transfers.Anemia improved s/ptransfusion





Objective


Exam


Last Set of Vital Signs





Vital Signs








  Date Time  Temp Pulse Resp B/P Pulse Ox O2 Delivery O2 Flow Rate FiO2


 


2/7/17 09:00      Room Air  


 


2/7/17 05:20 99.1 70 16 160/76 94   


 


2/6/17 16:34       2.00 





Capillary Refill : Less Than 3 Seconds


I&O











 Intake and Output 


 


 2/7/17





 00:00


 


Intake Total 1400 ml


 


Balance 1400 ml


 


 


 


Intake Oral 1400 ml


 


# Voids 6


 


# Bowel Movements 2








General:  Alert, Cooperative, No Acute Distress


HEENT:  Atraumatic, PERRLA, EOMI, Mucous Memb Moist/Pink


Neck:  Supple, No JVD


Lungs:  Clear to Auscultation


Heart:  Regular Rate


Abdomen:  Normal Bowel Sounds, Soft, No Tenderness


Extremities:  Other (trace edema left ankle)


Neuro:  Other (Impaired strength prox left hip Cognition mildly impaired)





Results


Lab


 Laboratory Tests


2/5/17 10:25: 


Hematocrit 27L, Hemoglobin 8.7#L


2/7/17 05:15: 


Hematocrit 28L, Hemoglobin 9.1L, Alanine Aminotransferase (ALT/SGPT) 34, 

Albumin 3.1L, Alkaline Phosphatase 51, Anion Gap 8, Aspartate Amino Transf (AST/

SGOT) 20, BUN/Creatinine Ratio 21, Basophils # (Auto) 0.0, Basophils (%) (Auto) 

1, Blood Urea Nitrogen 14, Calcium Level 8.1L, Carbon Dioxide Level 27, 

Chloride Level 103, Creatinine 0.66, Eosinophils # (Auto) 0.7H, Eosinophils (%) 

(Auto) 11H, Estimat Glomerular Filtration Rate > 60, Glucose Level 92, 

Lymphocytes # (Auto) 1.7, Lymphocytes (%) (Auto) 26, Mean Corpuscular 

Hemoglobin 30, Mean Corpuscular Hemoglobin Concent 32, Mean Corpuscular Volume 

93, Mean Platelet Volume 9.9, Monocytes # (Auto) 1.0, Monocytes (%) (Auto) 15H, 

Neutrophils # (Auto) 3.0, Neutrophils (%) (Auto) 47, Platelet Count 246, 

Potassium Level 3.8, Red Blood Count 3.02L, Red Cell Distribution Width 14.7H, 

Sodium Level 138, Total Bilirubin 1.1H, Total Protein 5.0L, White Blood Count 

6.3





Assessment/Plan


Assessment


Fall /frx left hip s/p repair DR Garza WBAT


Prior fall with rt hip frx repair


Mild dementia


Presumed osteoporosis


Depression 


Constipation


Hypothyroidism


Postop anemia -improved s/p transfusion





Plan


Continue PT/OT/ST


Team Conference tomorrow 2/8/17


F/U with Hospitalist and ortho EULA VALENTIN MD Feb 7, 2017 14:27

## 2017-02-07 NOTE — PHYSICAL THERAPY DAILY NOTE
PT Daily Note-Current


Subjective


Pt. up in recliner and c/o 6/10 pain initially. Nurse gives pain meds during 

Rx. Pt. shared that she has had many falls and many fractures. States her 

 passed away and she was in the shower just prior to the  and 

fell.





Pain





   Numeric Pain Scale:  6


   Location:  Left


   Location Body Site:  Hip


   Pain Description:  Heavy





Mental Status


Patient Orientation:  Normal For Age


pt. is flat in affect, quiet





Transfers


              Functional [place] Measure


0=Not Assessed/NA   4=Minimal Assistance


1=Total Assistance   5=Supervision or Setup


2=Maximal Assistance   6=Modified [place]


3=Moderate Assistance   7=Complete IndependenceIRFPAI Quality Coding Scale











6 Independent with activity with or without an assistive device


 


5  Patient requires set up or clean up by helper.  Patient completes activity  

by  themselves


 


4 Supervision or touching assist (CGA). [place] provide cues , steadying assist


 


3 The helper provides less than half the effort to complete the activity


 


2 The helper provides more than half the effort to complete the activity


 


1 Dependent.  The helper does all the effort to complete an activity 


 


7 Patient refused to complete or attempt activity


 


9 The patient did not perform the activity before the current illness or injury


 


88 Not attempted due to Medical conditions or safety concerns








Transfers (B, C, W/C) (FIM):  4


Scootin


Rollin


Supine to/from Sit:  4


Sit to/from Stand:  5


Bed to/from Chair:  4


needs assist LLE for TRF out of bed





Weight Bearing


Weight Bearing Restriction:  Weight Bearing/Tolerated





Gait Training


Does the Patient Walk?:  Yes


Gait (FIM):  2


Distance (FIM):  1=260-16 ft ([id number])


Gait Level of Assist:  4


Gait Persons Needed:  1


Gait Assistive Device:  FWW





Stair Training


pt. declines stair trial today as she is in pain





Exercises


Supine Ex:  Ankle pumps, Quad Set, Rolling, Glut sets, Heel Slides, Short Arc 

Quads, Scooting, Hip abd/add


Supine Reps:  15


Seated Therapy Exercises:  Ankle pumps, Sit to stand, Long arc quads


Seated Reps:  12





Assessment


Current Status:  Good Progress


pain limits participation today





PT Short Term Goals


Short Term Goals


Time Frame:  2017


Gait (FIM):  4


Distance (FIM):  3=150 ft


Gait Assistive Device:  FWW


Stairs (FIM):  2


# of Steps:  4





PT Long Term Goals


Long Term Goals


PT Long Term Goals Time Frame:  2017


Transfers (B,C,W/C) (FIM):  7


Sit to Lying (QC):  6


Lying-Sitting on Side/Bed(QC):  6


Sit to Stand (QC):  6


Rollin


Roll Left to Right (QC):  6


Chair/Bed-to-Chair Xfer(QC):  6


Car Transfer (QC):  5


Does the Patient Walk:  Yes


Gait (FIM):  6


Gait distance (FIM):  3=150 ft


Walk 10 feet (QC):  6


Walk 10ft-Uneven Surface(QC):  6


Walk 50ft with 2 Turns (QC):  6


Walk 150 ft (QC):  6


Gait Level of Assist:  6


Gait Assistive Device:  FWW


Does the Pt use WC or Scooter?:  No


Stairs (FIM):  5


# of Steps:  8 (household distance)


1 Step (curb) (QC):  6


4 Steps (QC):  6


12 Steps (QC):  88 (no goal set; not indicated)


Stairs Level Of Assist:  6


Picking up an Object (QC):  5 (supervision)





PT Plan


Treatment/Plan


Treatment Plan:  Continue Plan of Care


Treatment Plan:  Bed Mobility, Education, Functional Activity [place], Functional 

Strength, Group Therapy, Gait, Safety, Therapeutic Exercise, Transfers


Treatment Duration:  2017


Visits Per Week:  10-15


Minutes/Day (M-F):  60-90


Minutes/Day (Sat/Cardona):  prn





Safety Risks/Education


Patient Education:  Gait Training, Transfer Techniques, Correct Positioning, 

Disease Process, Safety Issues


Teaching Recipient:  Patient


Teaching Methods:  Demonstration, Discussion


Response to Teaching:  Verbalize Understanding, Return Demonstration, 

Reinforcement Needed





Time/GCodes


Time In:  1115


Time Out:  1215


Total Billed Treatment Time:  60


Total Billed Treatment


1,FA30m,EX15m,GT15m


G Codes Necessary:  GANESH Keenan PTA 2017 12:07

## 2017-02-07 NOTE — OCCUPATIONAL THER DAILY NOTE
OT Current Status-Daily Note


Subjective


Pt alert, lying in bed talking on phone.  Pt agreed to therapy.  No c/o pain at 

this time.





Mental Status/Objective


Patient Orientation:  Person, Place, Time, Situation


              Functional [place] Measure


0=Not Assessed/NA   4=Minimal Assistance


1=Total Assistance   5=Supervision or Setup


2=Maximal Assistance   6=Modified [place]


3=Moderate Assistance   7=Complete [place]





ADL-Treatment


Pt required min a to go from supine to sitting EOB.  Pt ambulated with CGA 

using FWW to bathroom then transferred into shower with grabbars and shower 

bench.  Pt was able to complete shower with SBA using grabbars, shower bench 

and hand held shower.  Pt dried self.  After set up, pt was able to don shirt, 

pants and R sock with SBA, min A donning L sock.  Pt stood at sink with SBA 

using FWW to complete grooming.


              Functional [place] Measure


0=Not Assessed/NA   4=Minimal Assistance


1=Total Assistance   5=Supervision or Setup


2=Maximal Assistance   6=Modified [place]


3=Moderate Assistance   7=Complete IndependenceIRFPAI Quality Coding Scale











6 Independent with activity with or without an assistive device


 


5  Patient requires set up or clean up by helper.  Patient completes activity  

by  themselves


 


4 Supervision or touching assist (CGA). [place] provide cues , steadying assist


 


3 The helper provides less than half the effort to complete the activity


 


2 The helper provides more than half the effort to complete the activity


 


1 Dependent.  The helper does all the effort to complete an activity 


 


7 Patient refused to complete or attempt activity


 


9 The patient did not perform the activity before the current illness or injury


 


88 Not attempted due to Medical conditions or safety concerns








Grooming (FIM):  5


Bathing (FIM):  5


Upper Body (FIM):  5


Lower Body Dressing (FIM):  4


Toileting (FIM):  5 (Using BSC, grabbars and FWW pt is able to complete with 

SBA.)


Transfers (B, C, W/C) (FIM):  4 (Using FWW, pt is able to complete with CGA)


Toilet/Commode Transfer (FIM):  4 (Using BSC, grabbars and FWW pt is able to 

complete with CGA)


Shower Transfer(FIM):  4 (Using shower bench, grabbars and FWW pt is able to 

complete with SBA.)





Other Treatment


Pt was transported to therapy gym via w/c.  Pt completed arm bike 15 min at 10 

abreu resistance to increase strength and activity tolerance for daily 

functional tasks (2 breaks taken).  After therapy, pt sitting in w/c with call 

light/phone in reach.  Nrsg present in room, all needs met in room.





OT Short Term Goals


Short Term Goals


Time Frame:  Feb 17, 2017


Bathing(FIM):  5


Toileting(FIM):  5


Toilet/Commode Transfer(FIM):  5


Shower Transfer(FIM):  5


Additional Short Term Goals:  2-Verbalize Understanding, 3-ImproveStrength/[place]


1=Demonstrate adherence to instructed precautions during ADL tasks.


2=Patient will verbalize/demonstrate understanding of assistive devices/

modifications for ADL.


3=Patient will improve strength/tolerance for activity to enable patient to 

perform ADL's.





OT Long Term Goals


Long Term Goals


Time Frame:  Feb 24, 2017


Eating (FIM):  6


Eating (QC):  6


Oral Hygiene (QC):  6


Grooming(FIM):  6


Bathing(FIM):  6


Shower/Bathe Self (QC):  6


Upper Body Dressing(FIM):  6


Upper Body Dressing (QC):  6


Lower Body Dressing(FIM):  6


Lower Body Dressing (QC):  6


On/Off Footwear (QC):  6


Toileting(FIM):  6


Toileting Hygiene (QC):  6


Toilet/Commode Transfer(FIM):  6


Toilet/Commode Transfer (QC):  6


Comprehension(FIM):  4


Expression (FIM):  5


Social Interaction(FIM):  5


Problem Solving(FIM):  4


Memory(FIM):  4


Additional Goals:  2-Verbalize Understanding, 3-ImproveStrength/[place]


1=Demonstrate adherence to instructed precautions during ADL tasks.


2=Patient will verbalize/demonstrate understanding of assistive devices/

modifications for ADL.


3=Patient will improve strength/tolerance for activity to enable patient to 

perform ADL's.





OT Education/Plan


Problem List/Assessment


Pt would benefit from skilled OT to increase her independence in basic self 

care to allow her to safely return to her home to live independently and to 

decrease caregiver burden





Discharge Recommendations


Plan/Recommendations:  Continue POC





Treatment Plan/Plan of Care


Patient would benefit from OT for education, treatment and training to promote 

independence in ADL's, mobility, safety and/or upper extremity function for ADL'

s.


Plan of Care:  ADL Retraining, Functional Mobility, Group Exercise/Act as Ind (

education, exercise, activity tolerance, memory, socialization, functional 

activities), UE Funct Exercise/Act, UE Neuromus Re-Ed/Coord


Treatment Duration:  Feb 24, 2017


Visits Per Week:  10-11


Minutes/Day (M-F):  75-90


Minutes/Day (Sat/Cardona):  PRN


Agreement:  Yes


Rehab Potential:  Fair





Time/GCodes


Start Time:  09:30


Stop Time:  10:45


Total Time Billed (hr/min):  75


Billed Treatment Time


1 visit-ADL 4 (65 min)  EX 2 (25 min)








BRE PLAZA Feb 7, 2017 14:33

## 2017-02-08 VITALS — DIASTOLIC BLOOD PRESSURE: 73 MMHG | SYSTOLIC BLOOD PRESSURE: 124 MMHG

## 2017-02-08 VITALS — DIASTOLIC BLOOD PRESSURE: 64 MMHG | SYSTOLIC BLOOD PRESSURE: 122 MMHG

## 2017-02-08 RX ADMIN — Medication SCH EA: at 06:37

## 2017-02-08 RX ADMIN — OMEGA-3 FATTY ACIDS CAP 1000 MG SCH MG: 1000 CAP at 06:37

## 2017-02-08 RX ADMIN — DOCUSATE SODIUM AND SENNOSIDES SCH EA: 8.6; 5 TABLET, FILM COATED ORAL at 09:00

## 2017-02-08 RX ADMIN — LEVOTHYROXINE SODIUM SCH MCG: 125 TABLET ORAL at 06:37

## 2017-02-08 RX ADMIN — DOCUSATE SODIUM AND SENNOSIDES SCH EA: 8.6; 5 TABLET, FILM COATED ORAL at 21:23

## 2017-02-08 RX ADMIN — ASPIRIN SCH MG: 81 TABLET ORAL at 09:00

## 2017-02-08 RX ADMIN — Medication SCH MG: at 17:02

## 2017-02-08 RX ADMIN — Medication SCH MG: at 06:37

## 2017-02-08 NOTE — PHYSICAL THERAPY DAILY NOTE
PT Daily Note-Current


Subjective


Pt rates L hip pain 7/10 prior to treatment and 7/10 post treatment session.  

Pt agreeable to treatment.





Mental Status


Patient Orientation:  Person, Place, Situation





Transfers


              Functional [place] Measure


0=Not Assessed/NA   4=Minimal Assistance


1=Total Assistance   5=Supervision or Setup


2=Maximal Assistance   6=Modified [place]


3=Moderate Assistance   7=Complete IndependenceIRFPAI Quality Coding Scale











6 Independent with activity with or without an assistive device


 


5  Patient requires set up or clean up by helper.  Patient completes activity  

by  themselves


 


4 Supervision or touching assist (CGA). [place] provide cues , steadying assist


 


3 The helper provides less than half the effort to complete the activity


 


2 The helper provides more than half the effort to complete the activity


 


1 Dependent.  The helper does all the effort to complete an activity 


 


7 Patient refused to complete or attempt activity


 


9 The patient did not perform the activity before the current illness or injury


 


88 Not attempted due to Medical conditions or safety concerns











Gait Training


Gait Assistive Device:  FWW


Pt amb with FWW and CGA x 60ft.  Pt requires vc's to maintain "strong knee" 

knee ext during stance phase of gait on the (L) LE.  Pt tends to amb with soft 

knee (L) and step to gait pattern.  Pt able to correct with vc's but requires 

the vc's throughout the treatment session.





Stair Training


 Stair Training: Handrails/:  2 handrails


Pt fearful of steps.  Practiced bottom step first where pt performed step up (R

) LE x 10 reps and toe tap (L) LE x 10 toe taps on 6" step.  Pt then attempted 

to climb steps of 4 and unable to go beyond the first step.  Pt taken to the //

bars to practice 5" curb step.  Pt able to ascend and descend curb step x 4 

trials with max vc's for sequence and CGA only.





Exercises


Supine Ex:  LE Protocol


Supine Reps:  20





Treatments


Pt seen for ther ex in bed.  Pt requires min A (L) LE SLR and hip abd.  Pt 

demonstrates SBA for out of bed transfer and min A for (L) LE sit to supine 

transfers.  Pt used BR with SBA -CGA and gait training with FWW and CGA.  Pt 

practiced 6" and 5" step up/ step down.  Unable to complete steps of 4 at this 

time.  Pt back to chair with call light and all needs met.





Assessment


Current Status:  Fair Progress


Pt anitra well with rest breaks as needed.  Pt will benefit from continued 

strengthening and functional mobility training to improve endurance and safety.

  Pt resting comfortably with all needs met post therapy.





PT Short Term Goals


Short Term Goals


Time Frame:  2017


Gait (FIM):  4


Distance (FIM):  3=150 ft


Gait Assistive Device:  FWW


Stairs (FIM):  2


# of Steps:  4





PT Long Term Goals


Long Term Goals


PT Long Term Goals Time Frame:  2017


Transfers (B,C,W/C) (FIM):  7


Sit to Lying (QC):  6


Lying-Sitting on Side/Bed(QC):  6


Sit to Stand (QC):  6


Rollin


Roll Left to Right (QC):  6


Chair/Bed-to-Chair Xfer(QC):  6


Car Transfer (QC):  5


Does the Patient Walk:  Yes


Gait (FIM):  6


Gait distance (FIM):  3=150 ft


Walk 10 feet (QC):  6


Walk 10ft-Uneven Surface(QC):  6


Walk 50ft with 2 Turns (QC):  6


Walk 150 ft (QC):  6


Gait Level of Assist:  6


Gait Assistive Device:  FWW


Does the Pt use WC or Scooter?:  No


Stairs (FIM):  5


# of Steps:  8 (household distance)


1 Step (curb) (QC):  6


4 Steps (QC):  6


12 Steps (QC):  88 (no goal set; not indicated)


Stairs Level Of Assist:  6


Picking up an Object (QC):  5 (supervision)





PT Plan


Treatment/Plan


Treatment Plan:  Continue Plan of Care


Treatment Plan:  Bed Mobility, Education, Functional Activity [place], Functional 

Strength, Group Therapy, Gait, Safety, Therapeutic Exercise, Transfers


Treatment Duration:  2017


Visits Per Week:  10-15


Minutes/Day (M-F):  60-90


Minutes/Day (Sat/Cardona):  prn





Time/GCodes


Time In:  1015


Time Out:  1120


Total Billed Treatment Time:  65


Total Billed Treatment


1, ther ex 15min, gait 35min, FA 15 min








RACHEL HANDLEY CPTJOEL 2017 11:34

## 2017-02-08 NOTE — THERAPY GROUP DAILY NOTE
Therapy Daily Group Note


Patient Education Topic


Home Safety





Exercises


LE Seated Exercise, UE Exercise





Other/Notes


[facility].  OT/PT group 

consisted of introductions (name, first car and what happened to it), 

socialization, ARU description, UE/LE seated exercises, problem solving and 

critical thinking activity and alert systems that can be used for safety in the 

home.  Pt actively participated in group.  Pt contributed to discussions with 

other patients.  Pt was able to gather information and make an informed answer 

to questions.  Pt completed UE/LE exercises well and followed instructions.  

After therapy, pt transferred to recliner with CGA.  Call light/phone in reach.

  All needs met in room.


Start Time:  13:00


Stop Time:  14:15


Total Billed Treatment Time:  75


Total Billed Treatment


1-GRP








BRE PLAZA Feb 8, 2017 15:00

## 2017-02-08 NOTE — OCCUPATIONAL THER DAILY NOTE
OT Current Status-Daily Note


Subjective


Pt alert, lying in bed.  Pt agreed to therapy.  No c/o pain.





Mental Status/Objective


Patient Orientation:  Person, Place, Time, Situation


              Functional [place] Measure


0=Not Assessed/NA   4=Minimal Assistance


1=Total Assistance   5=Supervision or Setup


2=Maximal Assistance   6=Modified [place]


3=Moderate Assistance   7=Complete [place]


Attachments:  IV





ADL-Treatment


Pt able to go from supine to sitting EOB with SBA, HOB slightly raised and 

using bed rails. CGA for transfers (toilet). Dons/doffs underwear/pants with 

SBA when standing to hike over hips. Assist to don socks.  Completes toileting 

and grooming by self.


              Functional [place] Measure


0=Not Assessed/NA   4=Minimal Assistance


1=Total Assistance   5=Supervision or Setup


2=Maximal Assistance   6=Modified [place]


3=Moderate Assistance   7=Complete IndependenceIRFPAI Quality Coding Scale











6 Independent with activity with or without an assistive device


 


5  Patient requires set up or clean up by helper.  Patient completes activity  

by  themselves


 


4 Supervision or touching assist (CGA). [place] provide cues , steadying assist


 


3 The helper provides less than half the effort to complete the activity


 


2 The helper provides more than half the effort to complete the activity


 


1 Dependent.  The helper does all the effort to complete an activity 


 


7 Patient refused to complete or attempt activity


 


9 The patient did not perform the activity before the current illness or injury


 


88 Not attempted due to Medical conditions or safety concerns








Toileting (FIM):  5


Transfers (B, C, W/C) (FIM):  4


Toilet/Commode Transfer (FIM):  4





Other Treatment


Pt completed arm bike 15 min at10 abreu resistance to increase strength and 

activity tolerance for daily functional tasks.  Pt tolerated well and did not 

need rest breaks.  After therapy, pt sitting in w/c in room with call light/

phone in reach.  All needs met in room.





OT Short Term Goals


Short Term Goals


Time Frame:  Feb 17, 2017


Bathing(FIM):  5


Toileting(FIM):  5


Toilet/Commode Transfer(FIM):  5


Shower Transfer(FIM):  5


Additional Short Term Goals:  2-Verbalize Understanding, 3-ImproveStrength/[place]ans


1=Demonstrate adherence to instructed precautions during ADL tasks.


2=Patient will verbalize/demonstrate understanding of assistive devices/

modifications for ADL.


3=Patient will improve strength/tolerance for activity to enable patient to 

perform ADL's.





OT Long Term Goals


Long Term Goals


Time Frame:  Feb 24, 2017


Eating (FIM):  6


Eating (QC):  6


Oral Hygiene (QC):  6


Grooming(FIM):  6


Bathing(FIM):  6


Shower/Bathe Self (QC):  6


Upper Body Dressing(FIM):  6


Upper Body Dressing (QC):  6


Lower Body Dressing(FIM):  6


Lower Body Dressing (QC):  6


On/Off Footwear (QC):  6


Toileting(FIM):  6


Toileting Hygiene (QC):  6


Toilet/Commode Transfer(FIM):  6


Toilet/Commode Transfer (QC):  6


Comprehension(FIM):  4


Expression (FIM):  5


Social Interaction(FIM):  5


Problem Solving(FIM):  4


Memory(FIM):  4


Additional Goals:  2-Verbalize Understanding, 3-ImproveStrength/Evans


1=Demonstrate adherence to instructed precautions during ADL tasks.


2=Patient will verbalize/demonstrate understanding of assistive devices/

modifications for ADL.


3=Patient will improve strength/tolerance for activity to enable patient to 

perform ADL's.





OT Education/Plan


Problem List/Assessment


Pt would benefit from skilled OT to increase her independence in basic self 

care to allow her to safely return to her home to live independently and to 

decrease caregiver burden





Discharge Recommendations


Plan/Recommendations:  Continue POC





Treatment Plan/Plan of Care


Patient would benefit from OT for education, treatment and training to promote 

independence in ADL's, mobility, safety and/or upper extremity function for ADL'

s.


Plan of Care:  ADL Retraining, Functional Mobility, Group Exercise/Act as Ind (

education, exercise, activity tolerance, memory, socialization, functional 

activities), UE Funct Exercise/Act, UE Neuromus Re-Ed/Coord


Treatment Duration:  Feb 24, 2017


Visits Per Week:  10-11


Minutes/Day (M-F):  75-90


Minutes/Day (Sat/Cardona):  PRN


Agreement:  Yes


Rehab Potential:  Fair





Time/GCodes


Start Time:  09:30


Stop Time:  10:15


Total Time Billed (hr/min):  45


Billed Treatment Time


1 visit-FA 2 (30 min)  EX 1 (15 min)








BRE PLAZA Feb 8, 2017 11:25

## 2017-02-08 NOTE — PM & R (SOAP) PROGRESS NOTE
Subjective


Subjective/Events-last exam


Patient was seen in her room this AM Patient Min assist for transfers.Current 

labs noted and DR Bryant note appreciated





Objective


Exam


Last Set of Vital Signs





Vital Signs








  Date Time  Temp Pulse Resp B/P Pulse Ox O2 Delivery O2 Flow Rate FiO2


 


2/8/17 06:00 98.5 67 16 124/73 94 Room Air  


 


2/6/17 16:34       2.00 





Capillary Refill : Less Than 3 Seconds


I&O











 Intake and Output 


 


 2/8/17





 00:00


 


Intake Total 940 ml


 


Balance 940 ml


 


 


 


Intake Oral 940 ml


 


# Voids 4


 


# Bowel Movements 2








General:  Alert, Cooperative, No Acute Distress


HEENT:  Atraumatic, PERRLA, EOMI, Mucous Memb Moist/Pink


Neck:  Supple, No JVD


Lungs:  Clear to Auscultation


Heart:  Regular Rate


Abdomen:  Normal Bowel Sounds, Soft, No Tenderness


Extremities:  Other (trace edema left ankle)


Neuro:  Other (Impaired strength prox left hip Cognition mildly impaired)





Results


Lab


 Laboratory Tests


2/7/17 05:15: 


Alanine Aminotransferase (ALT/SGPT) 34, Albumin 3.1L, Alkaline Phosphatase 51, 

Anion Gap 8, Aspartate Amino Transf (AST/SGOT) 20, BUN/Creatinine Ratio 21, 

Basophils # (Auto) 0.0, Basophils (%) (Auto) 1, Blood Urea Nitrogen 14, Calcium 

Level 8.1L, Carbon Dioxide Level 27, Chloride Level 103, Creatinine 0.66, 

Eosinophils # (Auto) 0.7H, Eosinophils (%) (Auto) 11H, Estimat Glomerular 

Filtration Rate > 60, Glucose Level 92, Hematocrit 28L, Hemoglobin 9.1L, 

Lymphocytes # (Auto) 1.7, Lymphocytes (%) (Auto) 26, Mean Corpuscular 

Hemoglobin 30, Mean Corpuscular Hemoglobin Concent 32, Mean Corpuscular Volume 

93, Mean Platelet Volume 9.9, Monocytes # (Auto) 1.0, Monocytes (%) (Auto) 15H, 

Neutrophils # (Auto) 3.0, Neutrophils (%) (Auto) 47, Platelet Count 246, 

Potassium Level 3.8, Red Blood Count 3.02L, Red Cell Distribution Width 14.7H, 

Sodium Level 138, Total Bilirubin 1.1H, Total Protein 5.0L, White Blood Count 

6.3





Assessment/Plan


Assessment


Fall /frx left hip s/p repair DR Garza WBAT


Prior fall with rt hip frx repair


Mild dementia


Presumed osteoporosis


Depression 


Constipation


Hypothyroidism


Postop anemia -improved s/p transfusion





Plan


Continue PT/OT/ST


Team Conference later today-See report for full functional update and POC and 

ELOS


F/U with Hospitalist and ortho PRN








EULA MARTINEZ MD Feb 8, 2017 11:21

## 2017-02-08 NOTE — SPEECH THERAPY DAILY NOTE
Speech Daily Progress Note


Subjective


The patient was seated upright in bed upon entrance. The patient greeted the 

clinician appropriately and agreed to participate in the cognitive treatment 

session on this date.





Objective


Assessment of Language Related Functional Activities (JENNIFER): 


- Telling Time: The patient demonstrated 60% accuracy with mild clinician 

cueing with telling time on an analog clock.


- Reading Instructions: The patient demonstrated 60% accuracy with reading 

simple instructions.


- Using a Calendar: The patient demonstrated 80% accuracy with using a calendar.


- Solving Daily Math Problems: The patient demonstrated 50% with mild clinician 

verbal cueing for simple math problem.





Assessment


Assessment Current Status:  Fair Progress





Treatment Plan


Continue Plan of Care





Communication


Comprehension:  4


Expression:  4





Social Cognition


Social Interaction:  4


Problem Solving:  3


Memory:  2





Speech Short Term Goals


Short Term Goals


Short Term Goals


1. The patient will recall and demonstrate three functional memory strategies.


2. The patient will display 80% accuracy with safety problem solving with mild 

clinician cueing.


3. The patient will demonstrated 90% accuracy (independently) with simple 

orientation information.


Time Frame-STG:  Two Weeks





Speech Long Term Goals


Long Term Goals


1. The patient will demonstrate improved cognition for increased safety and 

function with ADL's in the least restrictive setting.


Time Frame:  Three Weeks


Comprehension:  4


Expression:  5


Social Interaction:  5


Problem Solvin


Memory:  4





Speech-Plan


Treatment Plan


Speech Therapy Treatment Plan:  Continue Plan of Care


Skilled speech therapy to focus on functional memory and problem solving.


Treatment Duration:  2017


# of days/week


Four to Five


Visits Per Week:  Four to Five


Minutes/Day (M-F):  30


Rehab Potential:  Fair





Safety Risks/Education


Teaching Recipient:  Patient


Teaching Methods:  Discussion


Response to Teaching:  Reinforcement Needed


Education Topics Provided:  


Functional Problem Solving Strategies





Time


Speech Therapy Time In:  09:00


Speech Therapy Time Out:  09:30


Total Billed Time:  30


Billed Treatment Time


AVA Gillespie ELIZABETH  2017 10:01

## 2017-02-09 VITALS — SYSTOLIC BLOOD PRESSURE: 129 MMHG | DIASTOLIC BLOOD PRESSURE: 69 MMHG

## 2017-02-09 VITALS — SYSTOLIC BLOOD PRESSURE: 119 MMHG | DIASTOLIC BLOOD PRESSURE: 66 MMHG

## 2017-02-09 RX ADMIN — LEVOTHYROXINE SODIUM SCH MCG: 125 TABLET ORAL at 06:59

## 2017-02-09 RX ADMIN — ASPIRIN SCH MG: 81 TABLET ORAL at 08:18

## 2017-02-09 RX ADMIN — Medication SCH MG: at 16:48

## 2017-02-09 RX ADMIN — DOCUSATE SODIUM AND SENNOSIDES SCH EA: 8.6; 5 TABLET, FILM COATED ORAL at 08:18

## 2017-02-09 RX ADMIN — OMEGA-3 FATTY ACIDS CAP 1000 MG SCH MG: 1000 CAP at 06:59

## 2017-02-09 RX ADMIN — Medication SCH MG: at 06:59

## 2017-02-09 RX ADMIN — Medication SCH EA: at 06:59

## 2017-02-09 RX ADMIN — DOCUSATE SODIUM AND SENNOSIDES SCH EA: 8.6; 5 TABLET, FILM COATED ORAL at 20:12

## 2017-02-09 NOTE — OCCUPATIONAL THER DAILY NOTE
OT Current Status-Daily Note


Subjective


Pt alert, lying in bed.  Pt agreed to therapy.  No c/o pain.





Mental Status/Objective


Patient Orientation:  Person, Place, Time, Situation


              Functional [place] Measure


0=Not Assessed/NA   4=Minimal Assistance


1=Total Assistance   5=Supervision or Setup


2=Maximal Assistance   6=Modified [place]


3=Moderate Assistance   7=Complete [place]





ADL-Treatment


              Functional [place] Measure


0=Not Assessed/NA   4=Minimal Assistance


1=Total Assistance   5=Supervision or Setup


2=Maximal Assistance   6=Modified [place]


3=Moderate Assistance   7=Complete IndependenceIRFPAI Quality Coding Scale











6 Independent with activity with or without an assistive device


 


5  Patient requires set up or clean up by helper.  Patient completes activity  

by  themselves


 


4 Supervision or touching assist (CGA). [place] provide cues , steadying assist


 


3 The helper provides less than half the effort to complete the activity


 


2 The helper provides more than half the effort to complete the activity


 


1 Dependent.  The helper does all the effort to complete an activity 


 


7 Patient refused to complete or attempt activity


 


9 The patient did not perform the activity before the current illness or injury


 


88 Not attempted due to Medical conditions or safety concerns








Grooming (FIM):  5 (Standing at sink using FWW, pt is SBA for grooming skills.)


Bathing (FIM):  5 (Using hand held shower, shower bench and grabbars pt is able 

to complete with supervision.)


Bathing Location:  L Arm, R Arm, L Upper Leg, R Upper Leg, L Lower Leg (

including foot), R Lower Leg (including foot), Chest, Abdomen, Buttocks, 

Perineal Area


Upper Body (FIM):  5 (After set up, pt is able to don/doff upper body clothing 

by self.)


Lower Body Dressing (FIM):  4 (After set up, pt is able to don/doff lower body 

clothing over feet by self except L sock.  CGA when standing to hike pants over 

hips.)


Toileting (FIM):  5 (Using BSC, grabbars and FWW pt is SBA to complete toileting

)


Transfers (B, C, W/C) (FIM):  5 (Using FWW, pt is SBA for transfers)


Toilet/Commode Transfer (FIM):  5 (Using FWW, grabbars and BSC pt is SBA for 

transfer.)


Shower Transfer(FIM):  4 (Using FWW, grabbars and shower bench pt is CGA for 

transfer.)





Other Treatment


Pt was transported to therapy gym via w/c.  Pt completed arm bike 15 min at 10 

abreu resistance to increase strength and activity tolerance for daily 

functional tasks, no breaks taken.  Then complete UE activity that promoted 

dynamic sitting to improve core strength and sitting balance, holding UE 

against gravity and working on ROM with dynamic movements.  Pt tolerated well 

took one break in the 10 min activity.  After therapy, pt sitting in recliner 

with feet elevated.  Call light/phone in reach.  All needs met in room.





OT Short Term Goals


Short Term Goals


Time Frame:  2017


Bathing(FIM):  5


Toileting(FIM):  5


Toilet/Commode Transfer(FIM):  5


Shower Transfer(FIM):  5


Additional Short Term Goals:  2-Verbalize Understanding, 3-ImproveStrength/[place]


1=Demonstrate adherence to instructed precautions during ADL tasks.


2=Patient will verbalize/demonstrate understanding of assistive devices/

modifications for ADL.


3=Patient will improve strength/tolerance for activity to enable patient to 

perform ADL's.





OT Long Term Goals


Long Term Goals


Time Frame:  2017


Eating (FIM):  6


Eating (QC):  6


Groomin


Oral Hygiene (QC):  6


Bathing(FIM):  6


Shower/Bathe Self (QC):  6


Upper Body Dressing(FIM):  6


Upper Body Dressing (QC):  6


Lower Body Dressing(FIM):  6


Lower Body Dressing (QC):  6


On/Off Footwear (QC):  6


Toileting(FIM):  6


Toileting Hygiene (QC):  6


Toilet/Commode Transfer(FIM):  6


Toilet/Commode Transfer (QC):  6


Comprehension(FIM):  4


Expression (FIM):  5


Social Interaction(FIM):  5


Problem Solving(FIM):  4


Memory(FIM):  4


Additional Goals:  2-Verbalize Understanding, 3-ImproveStrength/Evans


1=Demonstrate adherence to instructed precautions during ADL tasks.


2=Patient will verbalize/demonstrate understanding of assistive devices/

modifications for ADL.


3=Patient will improve strength/tolerance for activity to enable patient to 

perform ADL's.





OT Education/Plan


Problem List/Assessment


Pt would benefit from skilled OT to increase her independence in basic self 

care to allow her to safely return to her home to live independently and to 

decrease caregiver burden





Discharge Recommendations


Plan/Recommendations:  Continue POC





Treatment Plan/Plan of Care


Patient would benefit from OT for education, treatment and training to promote 

independence in ADL's, mobility, safety and/or upper extremity function for ADL'

s.


Plan of Care:  ADL Retraining, Functional Mobility, Group Exercise/Act as Ind (

education, exercise, activity tolerance, memory, socialization, functional 

activities), UE Funct Exercise/Act, UE Neuromus Re-Ed/Coord


Treatment Duration:  2017


Visits Per Week:  10-11


Minutes/Day (M-F):  75-90


Minutes/Day (Sat/Cardona):  PRN


Agreement:  Yes


Rehab Potential:  Fair





Time/GCodes


Start Time:  08:45


Stop Time:  10:15


Total Time Billed (hr/min):  90


Billed Treatment Time


1 visit-ADL 4 (60 min)  EX 2 (30 min)








BRE PLAZA 2017 12:40

## 2017-02-09 NOTE — PHYSICAL THERAPY DAILY NOTE
PT Daily Note-Current


Subjective


Agreeable to PT.  Reports she is a bit tired today.





Transfers


              Functional [place] Measure


0=Not Assessed/NA   4=Minimal Assistance


1=Total Assistance   5=Supervision or Setup


2=Maximal Assistance   6=Modified [place]


3=Moderate Assistance   7=Complete IndependenceIRFPAI Quality Coding Scale











6 Independent with activity with or without an assistive device


 


5  Patient requires set up or clean up by helper.  Patient completes activity  

by  themselves


 


4 Supervision or touching assist (CGA). [place] provide cues , steadying assist


 


3 The helper provides less than half the effort to complete the activity


 


2 The helper provides more than half the effort to complete the activity


 


1 Dependent.  The helper does all the effort to complete an activity 


 


7 Patient refused to complete or attempt activity


 


9 The patient did not perform the activity before the current illness or injury


 


88 Not attempted due to Medical conditions or safety concerns








Transfers (B, C, W/C) (FIM):  4


Supine to/from Sit:  4 (assist with L LE up into bed)


Sit to/from Stand:  4 (CGA and skilled cues for sequencing. )


Sit to Lying (QC):  4


Sit to Stand (QC):  4


Occas cues to sequence and for safety. 


Sit to stand x10 reps.  


Sit to from supine x 2 reps.





Gait Training


Does the Patient Walk?:  Yes


Gait (FIM):  4


Distance (FIM):  3=150 ft


Distance:  150 ft x 2


Gait Level of Assist:  4


Gait Assistive Device:  FWW


Step to gait with the right LE due to difficulty with WB through the left.  

Narrow DIONE noted.  CGA with gait for safety purposes.





Stair Training


Pt traversed across a curb step x 1 with FWW with cues for sequencing and CGA





Exercises


Supine Ex:  Ankle pumps, Quad Set, Glut sets, Heel Slides, Short Arc Quads, 

Straight leg raise, Hip abd/add


Supine Reps:  10 (to increase LE strength for transfer and gait indep)


Seated Therapy Exercises:  Ankle pumps, Long arc quads, Hip flexion


Seated Reps:  10 (to increase strength for funcitonal transfers and gait)





Assessment


Current Status:  Good Progress


Gait distance is improving and functional transfers improved.  Pt demonstrated 

correct sequencing on steps with only light cuing.





PT Short Term Goals


Short Term Goals


Time Frame:  2017


Gait (FIM):  4 (met 17)


Distance (FIM):  3=150 ft


Gait Assistive Device:  FWW


Stairs (FIM):  2


# of Steps:  4





PT Long Term Goals


Long Term Goals


PT Long Term Goals Time Frame:  2017


Transfers (B,C,W/C) (FIM):  7


Sit to Lying (QC):  6


Lying-Sitting on Side/Bed(QC):  6


Sit to Stand (QC):  6


Rollin


Roll Left to Right (QC):  6


Chair/Bed-to-Chair Xfer(QC):  6


Car Transfer (QC):  5


Does the Patient Walk:  Yes


Gait (FIM):  6


Gait distance (FIM):  3=150 ft


Walk 10 feet (QC):  6


Walk 10ft-Uneven Surface(QC):  6


Walk 50ft with 2 Turns (QC):  6


Walk 150 ft (QC):  6


Gait Level of Assist:  6


Gait Assistive Device:  FWW


Does the Pt use WC or Scooter?:  No


Stairs (FIM):  5


# of Steps:  8 (household distance)


1 Step (curb) (QC):  6


4 Steps (QC):  6


12 Steps (QC):  88 (no goal set; not indicated)


Stairs Level Of Assist:  6


Picking up an Object (QC):  5 (supervision)





PT Plan


Problem List


Problem List:  Activity Tolerance, Functional Strength, Safety, Balance, Gait, 

Transfer, Bed Mobility





Treatment/Plan


Treatment Plan:  Continue Plan of Care


Treatment Plan:  Bed Mobility, Education, Functional Activity Evans, Functional 

Strength, Group Therapy, Gait, Safety, Therapeutic Exercise, Transfers


Treatment Duration:  2017


Visits Per Week:  10-15


Minutes/Day (M-F):  60-90


Minutes/Day (Sat/Cardona):  prn





Safety Risks/Education


Patient Education:  Transfer Techniques, Steps


Teaching Recipient:  Patient


Teaching Methods:  Demonstration, Discussion


Response to Teaching:  Reinforcement Needed





Time/GCodes


Time In:  1100


Time Out:  1200


Total Billed Treatment Time:  60


Total Billed Treatment


visit


EX 30


FA 15


GT 15








BRE SNYDER PT 2017 12:29

## 2017-02-09 NOTE — PM & R (SOAP) PROGRESS NOTE
Subjective


Subjective/Events-last exam


Patient was seen in her room this noonhour Patient CGA for transfers and gait





Objective


Exam


Last Set of Vital Signs





Vital Signs








  Date Time  Temp Pulse Resp B/P Pulse Ox O2 Delivery O2 Flow Rate FiO2


 


2/9/17 08:26      Room Air  


 


2/9/17 04:50 98.1 68 20 129/69 92   


 


2/6/17 16:34       2.00 





Capillary Refill : Less Than 3 Seconds


I&O











 Intake and Output 


 


 2/9/17





 00:00


 


Intake Total 1410 ml


 


Balance 1410 ml


 


 


 


Intake Oral 1410 ml


 


# Voids 8


 


# Bowel Movements 2








General:  Alert, Cooperative, No Acute Distress


HEENT:  Atraumatic, PERRLA, EOMI, Mucous Memb Moist/Pink


Neck:  Supple, No JVD


Lungs:  Clear to Auscultation


Heart:  Regular Rate


Abdomen:  Normal Bowel Sounds, Soft, No Tenderness


Extremities:  Other (trace edema left ankle)


Neuro:  Other (Impaired strength prox left hip Cognition mildly impaired)





Results


Lab


 Laboratory Tests


2/7/17 05:15: 


Alanine Aminotransferase (ALT/SGPT) 34, Albumin 3.1L, Alkaline Phosphatase 51, 

Anion Gap 8, Aspartate Amino Transf (AST/SGOT) 20, BUN/Creatinine Ratio 21, 

Basophils # (Auto) 0.0, Basophils (%) (Auto) 1, Blood Urea Nitrogen 14, Calcium 

Level 8.1L, Carbon Dioxide Level 27, Chloride Level 103, Creatinine 0.66, 

Eosinophils # (Auto) 0.7H, Eosinophils (%) (Auto) 11H, Estimat Glomerular 

Filtration Rate > 60, Glucose Level 92, Hematocrit 28L, Hemoglobin 9.1L, 

Lymphocytes # (Auto) 1.7, Lymphocytes (%) (Auto) 26, Mean Corpuscular 

Hemoglobin 30, Mean Corpuscular Hemoglobin Concent 32, Mean Corpuscular Volume 

93, Mean Platelet Volume 9.9, Monocytes # (Auto) 1.0, Monocytes (%) (Auto) 15H, 

Neutrophils # (Auto) 3.0, Neutrophils (%) (Auto) 47, Platelet Count 246, 

Potassium Level 3.8, Red Blood Count 3.02L, Red Cell Distribution Width 14.7H, 

Sodium Level 138, Total Bilirubin 1.1H, Total Protein 5.0L, White Blood Count 

6.3





Assessment/Plan


Assessment


Fall /frx left hip s/p repair DR Greg WBAT


Prior fall with rt hip frx repair


Mild dementia


Presumed osteoporosis


Depression 


Constipation


Hypothyroidism


Postop anemia -improved s/p transfusion





Plan


Continue PT/OT


F/U with Hospitalist and ortho PRN


Team Conference held yesterday-See report for full functional update and POC 

and EULA NORRIS MD Feb 9, 2017 16:38

## 2017-02-09 NOTE — PHYSICAL THERAPY DAILY NOTE
PT Daily Note-Current


Subjective


"Do you ever have dreams that come to reality?"  Pt talked about spirits and 

the such.  When asked if she felt scared, she said no.  When asked if she 

wanted to talk about her dream (because she indicated that it was intense) she 

said she did not want to talk about it.





Transfers


              Functional [place] Measure


0=Not Assessed/NA   4=Minimal Assistance


1=Total Assistance   5=Supervision or Setup


2=Maximal Assistance   6=Modified [place]


3=Moderate Assistance   7=Complete IndependenceIRFPAI Quality Coding Scale











6 Independent with activity with or without an assistive device


 


5  Patient requires set up or clean up by helper.  Patient completes activity  

by  themselves


 


4 Supervision or touching assist (CGA). [place] provide cues , steadying assist


 


3 The helper provides less than half the effort to complete the activity


 


2 The helper provides more than half the effort to complete the activity


 


1 Dependent.  The helper does all the effort to complete an activity 


 


7 Patient refused to complete or attempt activity


 


9 The patient did not perform the activity before the current illness or injury


 


88 Not attempted due to Medical conditions or safety concerns








Pt is SB-CGa with all sit to stand transfers.  Requires verbal cuing for hand 

placement 50% of the time and able to remember the other 50 %.  Pt performed 

sit to stand transfer x 6 reps during treatment.





Gait Training


Pt ambulated x 75 ft x 2 and 30 ft x 1 with FWW  with SB-CGA and cues for foot 

placement and to widen DIONE.





Exercises


NuStep Minutes:  20 (to facilitate LE strength and functional act tolerance)





Assessment


Current Status:  Good Progress


Pt is making functional gains.  Transfers improving.





PT Short Term Goals


Short Term Goals


Time Frame:  2017


Gait (FIM):  4 (met 17)


Distance (FIM):  3=150 ft


Gait Assistive Device:  FWW


Stairs (FIM):  2


# of Steps:  4





PT Long Term Goals


Long Term Goals


PT Long Term Goals Time Frame:  2017


Transfers (B,C,W/C) (FIM):  7


Sit to Lying (QC):  6


Lying-Sitting on Side/Bed(QC):  6


Sit to Stand (QC):  6


Rollin


Roll Left to Right (QC):  6


Chair/Bed-to-Chair Xfer(QC):  6


Car Transfer (QC):  5


Does the Patient Walk:  Yes


Gait (FIM):  6


Gait distance (FIM):  3=150 ft


Walk 10 feet (QC):  6


Walk 10ft-Uneven Surface(QC):  6


Walk 50ft with 2 Turns (QC):  6


Walk 150 ft (QC):  6


Gait Level of Assist:  6


Gait Assistive Device:  FWW


Does the Pt use WC or Scooter?:  No


Stairs (FIM):  5


# of Steps:  8 (household distance)


1 Step (curb) (QC):  6


4 Steps (QC):  6


12 Steps (QC):  88 (no goal set; not indicated)


Stairs Level Of Assist:  6


Picking up an Object (QC):  5 (supervision)





PT Plan


Problem List


Problem List:  Activity Tolerance, Functional Strength, Safety, Balance, Gait, 

Transfer, Bed Mobility





Treatment/Plan


Treatment Plan:  Continue Plan of Care


Treatment Plan:  Bed Mobility, Education, Functional Activity Evans, Functional 

Strength, Group Therapy, Gait, Safety, Therapeutic Exercise, Transfers


Treatment Duration:  2017


Visits Per Week:  10-15


Minutes/Day (M-F):  60-90


Minutes/Day (Sat/Cardona):  prn





Safety Risks/Education


Patient Education:  Transfer Techniques


Teaching Recipient:  Patient


Teaching Methods:  Demonstration, Discussion


Response to Teaching:  Reinforcement Needed





Time/GCodes


Time In:  1342


Time Out:  1516


Total Billed Treatment Time:  34


Total Billed Treatment


visit


EX 20


GT 14








BRE SNYDER PT 2017 14:33

## 2017-02-09 NOTE — SPEECH THERAPY DAILY NOTE
Speech Daily Progress Note


Subjective


The patient was seated upright in bed upon entrance. The patient greeted the 

clinician appropriately and agreed to participate in cognitive treatment on 

this date.





Objective


JENNIFER (Functional Activities completed at Home):


- Counting Money, Adding Change: The patient demonstrated 50% accuracy with 

counting money and change with moderate clinician verbal cueing. The patient 

demonstrated intermittent difficulty with coin addition.


- Addressing an Envelope: The patient demonstrated 100% accuracy with 

addressing an envelope and writing her personal home address in the return mail 

region.


- Writing a Check, Balancing a Check Book: The patient was able to accurately 

transcribe a check, however, demonstrated moderate difficulty with recording 

the information accurately in the check registrar and finding the accurate 

balance.





Assessment


Assessment Current Status:  Fair Progress





Treatment Plan


Continue Plan of Care





Communication


Comprehension:  4


Expression:  4





Social Cognition


Social Interaction:  4


Problem Solving:  3


Memory:  2





Speech Short Term Goals


Short Term Goals


Short Term Goals


1. The patient will recall and demonstrate three functional memory strategies.


2. The patient will display 80% accuracy with safety problem solving with mild 

clinician cueing.


3. The patient will demonstrated 90% accuracy (independently) with simple 

orientation information.


Time Frame-STG:  Two Weeks





Speech Long Term Goals


Long Term Goals


1. The patient will demonstrate improved cognition for increased safety and 

function with ADL's in the least restrictive setting.


Time Frame:  Three Weeks


Comprehension:  4


Expression:  5


Social Interaction:  5


Problem Solvin


Memory:  4





Speech-Plan


Treatment Plan


Speech Therapy Treatment Plan:  Continue Plan of Care


Continue skilled cognitive therapy for improvement with functional problem 

solving and memory.


Treatment Duration:  2017


# of days/week


Four to Five


Visits Per Week:  Four to Five


Minutes/Day (M-F):  30


Rehab Potential:  Fair





Safety Risks/Education


Teaching Recipient:  Patient


Teaching Methods:  Discussion


Response to Teaching:  Reinforcement Needed


Education Topics Provided:  


Orientation Strategies





Time


Speech Therapy Time In:  08:15


Speech Therapy Time Out:  08:45


Total Billed Time:  30


Billed Treatment Time


1AVA ELIZAB[address] ST 2017 08:57

## 2017-02-10 VITALS — SYSTOLIC BLOOD PRESSURE: 125 MMHG | DIASTOLIC BLOOD PRESSURE: 71 MMHG

## 2017-02-10 VITALS — SYSTOLIC BLOOD PRESSURE: 156 MMHG | DIASTOLIC BLOOD PRESSURE: 99 MMHG

## 2017-02-10 RX ADMIN — OMEGA-3 FATTY ACIDS CAP 1000 MG SCH MG: 1000 CAP at 05:52

## 2017-02-10 RX ADMIN — LEVOTHYROXINE SODIUM SCH MCG: 125 TABLET ORAL at 05:52

## 2017-02-10 RX ADMIN — Medication SCH EA: at 05:52

## 2017-02-10 RX ADMIN — Medication SCH MG: at 05:52

## 2017-02-10 RX ADMIN — Medication SCH MG: at 05:53

## 2017-02-10 RX ADMIN — Medication SCH MG: at 17:30

## 2017-02-10 RX ADMIN — ASPIRIN SCH MG: 81 TABLET ORAL at 10:10

## 2017-02-10 RX ADMIN — DOCUSATE SODIUM AND SENNOSIDES SCH EA: 8.6; 5 TABLET, FILM COATED ORAL at 09:30

## 2017-02-10 RX ADMIN — DOCUSATE SODIUM AND SENNOSIDES SCH EA: 8.6; 5 TABLET, FILM COATED ORAL at 19:57

## 2017-02-10 NOTE — PM & R (SOAP) PROGRESS NOTE
Subjective


Subjective/Events-last exam


Patient was seen in her room this AM with ST ST notes patient better oriented 

today





Objective


Exam


Last Set of Vital Signs





Vital Signs








  Date Time  Temp Pulse Resp B/P Pulse Ox O2 Delivery O2 Flow Rate FiO2


 


2/10/17 05:56 97.5 74 16 156/99 96 Room Air  


 


2/6/17 16:34       2.00 





Capillary Refill : Less Than 3 Seconds


I&O











 Intake and Output 


 


 2/10/17





 00:00


 


Intake Total 1330 ml


 


Balance 1330 ml


 


 


 


Intake Oral 1330 ml


 


# Voids 7


 


# Bowel Movements 2








General:  Alert, Cooperative, No Acute Distress


HEENT:  Atraumatic, PERRLA, EOMI, Mucous Memb Moist/Pink


Neck:  Supple, No JVD


Lungs:  Clear to Auscultation


Heart:  Regular Rate


Abdomen:  Normal Bowel Sounds, Soft, No Tenderness


Extremities:  Other (trace edema left ankle)


Neuro:  Other (Impaired strength prox left hip Cognition mildly impaired)





Assessment/Plan


Assessment


Fall /frx left hip s/p repair DR Garza WBAT


Prior fall with rt hip frx repair


Mild dementia


Presumed osteoporosis


Depression 


Constipation


Hypothyroidism


Postop anemia -improved s/p transfusion





Plan


Continue PT/OT


F/U with Hospitalist and ortho PRN


Team Conference held 2-8-17-See report for full functional update and POC and 

EULA NORRIS MD Feb 10, 2017 08:51

## 2017-02-10 NOTE — PHYSICAL THERAPY DAILY NOTE
PT Daily Note-Current


Subjective


Agrees to PT.  No complaints.





Mental Status


Patient Orientation:  Person, Place, Time, Situation





Transfers


              Functional [place] Measure


0=Not Assessed/NA   4=Minimal Assistance


1=Total Assistance   5=Supervision or Setup


2=Maximal Assistance   6=Modified [place]


3=Moderate Assistance   7=Complete IndependenceIRFPAI Quality Coding Scale











6 Independent with activity with or without an assistive device


 


5  Patient requires set up or clean up by helper.  Patient completes activity  

by  themselves


 


4 Supervision or touching assist (CGA). [place] provide cues , steadying assist


 


3 The helper provides less than half the effort to complete the activity


 


2 The helper provides more than half the effort to complete the activity


 


1 Dependent.  The helper does all the effort to complete an activity 


 


7 Patient refused to complete or attempt activity


 


9 The patient did not perform the activity before the current illness or injury


 


88 Not attempted due to Medical conditions or safety concerns








Transfers (B, C, W/C) (FIM):  5


Sit to Stand (QC):  5


Sit to from stand x 5 reps with focus on hand placement and sequencing.  

Initially needed 100 % cues but by last transfer did so without cueing.  SBA 

with all transfers.





Gait Training


Does the Patient Walk?:  Yes


Gait (FIM):  5


Distance (FIM):  3=150 ft


Distance:  150 ft x 2; 50 ft x 2


Gait Assistive Device:  FWW


Gait training with focus on step through gait with the right LE.  Initially 

only able to step to the left with the right but after practice and cuing, pt 

was walking with a step through pattern.  Improved with treatment!





Stair Training


 Stair Training: Handrails/:  2 handrails


Stairs (FIM):  2


#of Steps:  4


Stairs:  Pattern:  Step to


Level of Assist:  4 (CGA and skilleed cueing for sequencing. )





Exercises


Seated Therapy Exercises:  Sit to stand, Long arc quads, Hip flexion, Hamstring 

Curls


Seated Reps:  15 (to increase strength to facilitate improved ability to 

transfer and ambulate)





Assessment


Current Status:  Good Progress


Improved gait today post treatment.  Strength improving as evidenced by sit to 

stand abiliyt.





PT Short Term Goals


Short Term Goals


Time Frame:  2017


Gait (FIM):  4 (met 17)


Distance (FIM):  3=150 ft


Gait Assistive Device:  FWW


Stairs (FIM):  2 (met 2/10/17)


# of Steps:  4





PT Long Term Goals


Long Term Goals


PT Long Term Goals Time Frame:  2017


Transfers (B,C,W/C) (FIM):  7


Sit to Lying (QC):  6


Lying-Sitting on Side/Bed(QC):  6


Sit to Stand (QC):  6


Rollin


Roll Left to Right (QC):  6


Chair/Bed-to-Chair Xfer(QC):  6


Car Transfer (QC):  5


Does the Patient Walk:  Yes


Gait (FIM):  6


Gait distance (FIM):  3=150 ft


Walk 10 feet (QC):  6


Walk 10ft-Uneven Surface(QC):  6


Walk 50ft with 2 Turns (QC):  6


Walk 150 ft (QC):  6


Gait Level of Assist:  6


Gait Assistive Device:  FWW


Does the Pt use WC or Scooter?:  No


Stairs (FIM):  5


# of Steps:  8 (household distance)


1 Step (curb) (QC):  6


4 Steps (QC):  6


12 Steps (QC):  88 (no goal set; not indicated)


Stairs Level Of Assist:  6


Picking up an Object (QC):  5 (supervision)





PT Plan


Problem List


Problem List:  Activity Tolerance, Functional Strength, Safety, Balance, Gait, 

Transfer, Bed Mobility





Treatment/Plan


Treatment Plan:  Continue Plan of Care


Treatment Plan:  Bed Mobility, Education, Functional Activity [place], Functional 

Strength, Group Therapy, Gait, Safety, Therapeutic Exercise, Transfers


Treatment Duration:  2017


Visits Per Week:  10-15


Minutes/Day (M-F):  60-90


Minutes/Day (Sat/Cardona):  prn





Safety Risks/Education


Patient Education:  Steps


Teaching Recipient:  Patient


Teaching Methods:  Demonstration, Discussion


Response to Teaching:  Reinforcement Needed





Time/GCodes


Time In:  1100


Time Out:  1145


Total Billed Treatment Time:  45


Total Billed Treatment


visit


GT 30


EX 15








BRE SNYDER PT Feb 10, 2017 11:46

## 2017-02-10 NOTE — BEHAVIORAL HEALTH CONSULT
Consult-


Consult


Referral: Gayathri Landa is a 84-year-old, , female referred by Dr. Cruz for a clinical diagnostic assessment.  Information for this evaluation 

was gathered from self-report, medical records, and .  





Presenting Problem: The presenting clinical problem is depression, paranoia. 

Gayathri reported she is doing fine. She reported she fell in 2016, but 

an x-ray did not show anything was wrong. She stated she has been in pain since 

then and seem proud when she stated she only took aspirin. She stated the end 

of 2017 is when another x-ray was done and she had fractured her hip. 

She reported she had surgery on February 3. She denied any symptoms of 

depression, but on further prompting she did admit to some depression following 

her husbands death. She denied any symptoms of anxiety. She stated she has 

trouble sleeping because of pain. She denied any changes in her mood. She 

stated she is irritable at times, but attributes this to living alone. She 

reported her physical therapy is going well, but she hurts after sitting for 

extended periods. She stated she expects she will return home once she has 

completed her inpatient rehabilitation. Therapist stated that sometimes people 

need to go to assisted living to give them extra time to heal. She just smiled 

and did not respond when therapist stated this. She reported she thinks she has 

supportive family and friends that can help her if it is needed. 





Observations/Mental Status:  Gayathri was returning to her chair when therapist 

arrived. Overall appearance was unremarkable clinically. Gayathri appeared to be 

an adequate historian. Observed gait and gross motor movements indicated poor 

balance/coordination and a reliance on mechanical assistance (i.e.,walker, 

wheelchair). In regards to pain, reported some pain. Flor general approach 

to the evaluation was cooperative. Orientation was intact for person, time, and 

situation, but not place. Gayathri evidenced good understanding of the reason for 

the appointment. Flor in-session behavior was cooperative. The predominant 

mood was generally euthymic with affect appropriate to expressed concerns and 

presenting problem. Immediate attention and concentration was grossly intact. 

Level of intellectual functioning compared to same age peers was estimated to 

be in the average range. Thought processes were found to be generally logical, 

coherent and goal directed. Thought content appeared normal. There was no 

report or evidence of hallucinations or delusions. Psychomotor functioning was 

within normal limits. Tone of voice was normal and controlled. Expressive 

speech was marked by fluent speech and language. Eye contact was good. Insight 

was average. Overall, style of interacting during the appointment was 

disinterested. 





Current/Previous Mental Health Treatment: Past psychiatric history was reported 

as none. History of self or other harm none reported. Family history of mental 

health was reported as none.





Medical History: Medical conditions were reported as recent surgery to repair 

her hip. Current medications: celexa, synthroid, and tramadol. Drug allergies: 

penicillin. Current physician is Dr. Mehta.





Educational and Vocational Histories: Gayathri worked at [employer] for many years in 

[place], [place]. 





Family and Social Histories: Prior to hospitalization, Gayathri has been living 

by herself in [place], [place]. She reported her  of 50 years  in 

2016 from cancer. She reported she has three daughters ages 56, 51, 

and 47. She stated her daughters live in [place], [place], and [place]. 

She reported her son passed away 10 years ago. She stated [place] is her home 

town, but she lived in [place] for several years. She reported she does not 

like [place], but her  wanted to return to [place]. She stated she has 

two sisters that live in [place], one in [place], and one in [place]. 

She reported she is close with her sisters, but she does not spend much time 

with them. She stated she is also close with her daughter, but again does not 

see them often. She reported she does talk with her daughters on the phone. She 

reported she likes to garden. 





Summary of Assessment Information/Recommendations: Gayathri is an 84-year-old 

female. Even though Gayathri denied most psychiatric symptoms, based on hospital 

records and observations and it appears likely that she may have some problems 

with depression. Following current assessment, presenting problem and symptoms 

appear consistent with a preliminary diagnosis of F43.21. Current emotional 

symptoms are of mild intensity. Gayathri did not seem interested in talking to 

therapist. It seemed that she denied most questions, possibly in an effort to 

appear that she has no problems. When therapist asked about depression 

following the death of her  she was hesitant to say yes. She completed 

the Mini Mental Status Exam Second Edition. She stated that she was in [facility] at the orthopedic hospital. She was able to recall 1 of 3 

words after a delay. She was not able to subtract 7 from 100 and her responses 

in order were 3, 0, 8, 1, and 2. Therapist consulted with Dr. Silvia Waters 

regarding Flor cognitive state. There are concerns about Flor attention 

abilities and that the possibly inattention could result in falls or mistakes. 





It is recommended that Gayathri have neuropsychological testing to further assess 

her cognitive state. This can be done at [facility] with Dr. Silvia Waters. 





It is recommended that she be monitored until further testing can be completed. 





ICD-10 Diagnostic Impressions:


   F43.21 Adjustment Disorder with Depression


   R41.9 Unspecified Neurocognitive Disorder








HUBER RYAN Feb 10, 2017 09:00

## 2017-02-10 NOTE — SPEECH THERAPY DAILY NOTE
Speech Daily Progress Note


Subjective


The patient was seated upright in recliner upon entrance. The patient greeted 

the clinician appropriately and agreed to participate in cognitive therapy on 

this date.





Objective


Functional Memory: The patient was read short paragraphs and asked to recall 

specific information asked by the clinician immediately following. The patient 

demonstrated fair to good accuracy with this take (75%) with moderate clinician 

cueing (repetition).





Assessment


Assessment Current Status:  Fair Progress





Treatment Plan


Continue Plan of Care





Communication


Comprehension:  4


Expression:  4





Social Cognition


Social Interaction:  4


Problem Solvin


Memory:  3





Speech Short Term Goals


Short Term Goals


Short Term Goals


1. The patient will recall and demonstrate three functional memory strategies.


2. The patient will display 80% accuracy with safety problem solving with mild 

clinician cueing.


3. The patient will demonstrated 90% accuracy (independently) with simple 

orientation information.


Time Frame-STG:  Two Weeks





Speech Long Term Goals


Long Term Goals


1. The patient will demonstrate improved cognition for increased safety and 

function with ADL's in the least restrictive setting.


Time Frame:  Three Weeks


Comprehension:  4


Expression:  5


Social Interaction:  5


Problem Solvin


Memory:  4





Speech-Plan


Treatment Plan


Speech Therapy Treatment Plan:  Continue Plan of Care


Continue skilled cognitive therapy to focus on functional memory strategies.


Treatment Duration:  2017


# of days/week


Four to Five


Visits Per Week:  Four to Five


Minutes/Day (M-F):  30


Rehab Potential:  Fair





Safety Risks/Education


Teaching Recipient:  Patient


Teaching Methods:  Discussion


Response to Teaching:  Reinforcement Needed


Education Topics Provided:  


Memory Strategies





Time


Speech Therapy Time In:  08:15


Speech Therapy Time Out:  08:45


Total Billed Time:  30


Billed Treatment Time


1, MILDRED MADRIGAL Feb 10, 2017 11:46

## 2017-02-10 NOTE — THERAPY GROUP DAILY NOTE
Therapy Daily Group Note


Patient Education Topic


Home Safety, Fall Prevention





Exercises


Fine Motor, UE Exercise





Other/Notes


Pt transported with w/c to OT/PT lunch group in [facility].  Group 

therapy consisted of introductions, socialization, fine motor skills, UE gross 

motor skills, memory recall of different restaurants were favorites and kitchen/

home safety and AE in kitchen.  Pt actively participated in group and 

contributed to discussions around the table appropriately.  Pt was able to open/

close container and packages. Pt transported back to room via w/c then 

transferred with CGA to recliner with call light/phone in reach.  All needs met 

in room.


Start Time:  12:00


Stop Time:  13:10


Total Billed Treatment Time:  70


Total Billed Treatment


1-GRP








BRE PLAZA Feb 10, 2017 14:17

## 2017-02-10 NOTE — OCCUPATIONAL THER DAILY NOTE
OT Current Status-Daily Note


Subjective


Pt alert, sitting in recliner.  Pt asked the symptoms of heart attack and 

stroke.  Pt seemed anxious and stated that she felt pressure the bottom of her 

R foot.  Physician made rounds, OT reported about pt's concerns.  Pt agreed to 

therapy.





Mental Status/Objective


Patient Orientation:  Person, Place, Time, Situation


              Functional [place] Measure


0=Not Assessed/NA   4=Minimal Assistance


1=Total Assistance   5=Supervision or Setup


2=Maximal Assistance   6=Modified [place]


3=Moderate Assistance   7=Complete [place]





ADL-Treatment


Massage to LE's for muscle tightness and edema.  Compression stocking donned by 

OT after massage.  Pt then doffed/donned underwear with SBA when standing to 

hike over hips.  Pt donned/doffed slip on shoes by self.  Pt ambulated to 

bathroom, transferred to toilet with SBA (BSC, yennifer, FWW) then completed 

toileting with SBA to manipulate clothing.  Pt donned pants by self then 

slipped shoes.  After therapy, pt sitting in recliner with call light/phone in 

reach.  All needs met in room.


              Functional [place] Measure


0=Not Assessed/NA   4=Minimal Assistance


1=Total Assistance   5=Supervision or Setup


2=Maximal Assistance   6=Modified [place]


3=Moderate Assistance   7=Complete IndependenceIRFPAI Quality Coding Scale











6 Independent with activity with or without an assistive device


 


5  Patient requires set up or clean up by helper.  Patient completes activity  

by  themselves


 


4 Supervision or touching assist (CGA). [place] provide cues , steadying assist


 


3 The helper provides less than half the effort to complete the activity


 


2 The helper provides more than half the effort to complete the activity


 


1 Dependent.  The helper does all the effort to complete an activity 


 


7 Patient refused to complete or attempt activity


 


9 The patient did not perform the activity before the current illness or injury


 


88 Not attempted due to Medical conditions or safety concerns








Upper Body (FIM):  5


Lower Body Dressing (FIM):  5


Toileting (FIM):  5


Transfers (B, C, W/C) (FIM):  5


Toilet/Commode Transfer (FIM):  5





OT Short Term Goals


Short Term Goals


Time Frame:  2017


Bathing(FIM):  5


Toileting(FIM):  5


Toilet/Commode Transfer(FIM):  5


Shower Transfer(FIM):  5


Additional Short Term Goals:  2-Verbalize Understanding, 3-ImproveStrength/[place]


1=Demonstrate adherence to instructed precautions during ADL tasks.


2=Patient will verbalize/demonstrate understanding of assistive devices/

modifications for ADL.


3=Patient will improve strength/tolerance for activity to enable patient to 

perform ADL's.





OT Long Term Goals


Long Term Goals


Time Frame:  2017


Eating (FIM):  6


Eating (QC):  6


Groomin


Oral Hygiene (QC):  6


Bathing(FIM):  6


Shower/Bathe Self (QC):  6


Upper Body Dressing(FIM):  6


Upper Body Dressing (QC):  6


Lower Body Dressing(FIM):  6


Lower Body Dressing (QC):  6


On/Off Footwear (QC):  6


Toileting(FIM):  6


Toileting Hygiene (QC):  6


Toilet/Commode Transfer(FIM):  6


Toilet/Commode Transfer (QC):  6


Comprehension(FIM):  4


Expression (FIM):  5


Social Interaction(FIM):  5


Problem Solving(FIM):  4


Memory(FIM):  4


Additional Goals:  2-Verbalize Understanding, 3-ImproveStrength/[place]ans


1=Demonstrate adherence to instructed precautions during ADL tasks.


2=Patient will verbalize/demonstrate understanding of assistive devices/

modifications for ADL.


3=Patient will improve strength/tolerance for activity to enable patient to 

perform ADL's.





OT Education/Plan


Problem List/Assessment


Pt would benefit from skilled OT to increase her independence in basic self 

care to allow her to safely return to her home to live independently and to 

decrease caregiver burden





Discharge Recommendations


Plan/Recommendations:  Continue POC





Treatment Plan/Plan of Care


Patient would benefit from OT for education, treatment and training to promote 

independence in ADL's, mobility, safety and/or upper extremity function for ADL'

s.


Plan of Care:  ADL Retraining, Functional Mobility, Group Exercise/Act as Ind (

education, exercise, activity tolerance, memory, socialization, functional 

activities), UE Funct Exercise/Act, UE Neuromus Re-Ed/Coord


Treatment Duration:  2017


Visits Per Week:  10-11


Minutes/Day (M-F):  75-90


Minutes/Day (Sat/Cardona):  PRN


Agreement:  Yes


Rehab Potential:  Fair





Time/GCodes


Start Time:  09:00


Stop Time:  10:00


Total Time Billed (hr/min):  60


Billed Treatment Time


1 visit-ADL 4 (60 min)








BRE PLAZA Feb 10, 2017 10:57

## 2017-02-11 VITALS — SYSTOLIC BLOOD PRESSURE: 155 MMHG | DIASTOLIC BLOOD PRESSURE: 74 MMHG

## 2017-02-11 VITALS — DIASTOLIC BLOOD PRESSURE: 69 MMHG | SYSTOLIC BLOOD PRESSURE: 153 MMHG

## 2017-02-11 RX ADMIN — Medication SCH MG: at 06:29

## 2017-02-11 RX ADMIN — OMEGA-3 FATTY ACIDS CAP 1000 MG SCH MG: 1000 CAP at 06:29

## 2017-02-11 RX ADMIN — ASPIRIN SCH MG: 81 TABLET ORAL at 08:29

## 2017-02-11 RX ADMIN — Medication SCH MG: at 17:59

## 2017-02-11 RX ADMIN — LEVOTHYROXINE SODIUM SCH MCG: 125 TABLET ORAL at 06:29

## 2017-02-11 RX ADMIN — Medication SCH EA: at 06:29

## 2017-02-11 RX ADMIN — DOCUSATE SODIUM AND SENNOSIDES SCH EA: 8.6; 5 TABLET, FILM COATED ORAL at 20:07

## 2017-02-11 RX ADMIN — DOCUSATE SODIUM AND SENNOSIDES SCH EA: 8.6; 5 TABLET, FILM COATED ORAL at 08:28

## 2017-02-11 NOTE — PHYSICAL THERAPY DAILY NOTE
PT Daily Note-Current


Subjective


Pt in bed, very sleepy.  Agreed to therapy after some discussion on the 

importance of exercise in achieving goals.





Transfers


              Functional [place] Measure


0=Not Assessed/NA   4=Minimal Assistance


1=Total Assistance   5=Supervision or Setup


2=Maximal Assistance   6=Modified [place]


3=Moderate Assistance   7=Complete IndependenceIRFPAI Quality Coding Scale











6 Independent with activity with or without an assistive device


 


5  Patient requires set up or clean up by helper.  Patient completes activity  

by  themselves


 


4 Supervision or touching assist (CGA). [place] provide cues , steadying assist


 


3 The helper provides less than half the effort to complete the activity


 


2 The helper provides more than half the effort to complete the activity


 


1 Dependent.  The helper does all the effort to complete an activity 


 


7 Patient refused to complete or attempt activity


 


9 The patient did not perform the activity before the current illness or injury


 


88 Not attempted due to Medical conditions or safety concerns








Transfers (B, C, W/C) (FIM):  4


Scootin


Rollin


Roll Left to Right (QC):  5


Supine to/from Sit:  6


Sit to/from Stand:  5


Sit to Lying (QC):  5


Sit to Stand (QC):  5


Chair/Bed-to-Chair Xfer(QC):  4


Bed to/from Chair:  4


Pt impulsive during transfers supervision is warranted do to safety.





Gait Training


Does the Patient Walk?:  Yes


Distance (FIM):  3=150 ft


Distance:  200


Gait Level of Assist:  4


Gait Persons Needed:  1


Gait Assistive Device:  FWW


Pt had one lob episode when in the room.  She was walking toward the bed and 

became distracted on her task when telling the nurse how she wanted the bed 

made.  Minimal physical assist to recover balance.





Stair Training


 Stair Training: Handrails/:  2 handrails


Stairs (FIM):  2


#of Steps:  5


Stairs:  Pattern:  Step to


Level of Assist:  4


Pt had one loss of balance when descending the stairs.  She caught her toe on a 

step and required minimal assist to recover her balance.





Assessment


Current Status:  Fair Progress


Pt is gaining strength but continues to have safety issues due to lack of LE 

strength and impulsive nature.  She will benefit from safety education.





PT Short Term Goals


Short Term Goals


Time Frame:  2017


Gait (FIM):  4 (met 17)


Distance (FIM):  3=150 ft


Gait Assistive Device:  FWW


Stairs (FIM):  2 (met 2/10/17)


# of Steps:  4





PT Long Term Goals


Long Term Goals


PT Long Term Goals Time Frame:  2017


Transfers (B,C,W/C) (FIM):  7


Sit to Lying (QC):  6


Lying-Sitting on Side/Bed(QC):  6


Sit to Stand (QC):  6


Rollin


Roll Left to Right (QC):  6


Chair/Bed-to-Chair Xfer(QC):  6


Car Transfer (QC):  5


Does the Patient Walk:  Yes


Gait (FIM):  6


Gait distance (FIM):  3=150 ft


Walk 10 feet (QC):  6


Walk 10ft-Uneven Surface(QC):  6


Walk 50ft with 2 Turns (QC):  6


Walk 150 ft (QC):  6


Gait Level of Assist:  6


Gait Assistive Device:  FWW


Does the Pt use WC or Scooter?:  No


Stairs (FIM):  5


# of Steps:  8 (household distance)


1 Step (curb) (QC):  6


4 Steps (QC):  6


12 Steps (QC):  88 (no goal set; not indicated)


Stairs Level Of Assist:  6


Picking up an Object (QC):  5 (supervision)





PT Plan


Problem List


Problem List:  Safety, Balance, Gait, Transfer





Treatment/Plan


Treatment Plan:  Continue Plan of Care


Treatment Plan:  Bed Mobility, Education, Functional Activity [place], Functional 

Strength, Group Therapy, Gait, Safety, Therapeutic Exercise, Transfers


Treatment Duration:  2017


Visits Per Week:  10-15


Minutes/Day (M-F):  60-90


Minutes/Day (Sat/Cardona):  prn





Safety Risks/Education


Patient Education:  Gait Training, Safety Issues


Teaching Recipient:  Patient


Teaching Methods:  Demonstration, Discussion





Time/GCodes


Time In:  1110


Time Out:  1130


Total Billed Treatment Time:  20


Total Billed Treatment


visit, gait 20 min








FAUSTINO FISH PT 2017 13:29

## 2017-02-11 NOTE — PHYSICAL THERAPY DAILY NOTE
PT Daily Note-Current


Subjective


Pt up in chair and agreeable to exercise.  she requests to use the restroom 

before beginning therapy.





Transfers


              Functional [place] Measure


0=Not Assessed/NA   4=Minimal Assistance


1=Total Assistance   5=Supervision or Setup


2=Maximal Assistance   6=Modified [place]


3=Moderate Assistance   7=Complete IndependenceIRFPAI Quality Coding Scale











6 Independent with activity with or without an assistive device


 


5  Patient requires set up or clean up by helper.  Patient completes activity  

by  themselves


 


4 Supervision or touching assist (CGA). [place] provide cues , steadying assist


 


3 The helper provides less than half the effort to complete the activity


 


2 The helper provides more than half the effort to complete the activity


 


1 Dependent.  The helper does all the effort to complete an activity 


 


7 Patient refused to complete or attempt activity


 


9 The patient did not perform the activity before the current illness or injury


 


88 Not attempted due to Medical conditions or safety concerns








Sit to/from Stand:  5





Gait Training


Distance:  150


Gait Level of Assist:  4


Gait Persons Needed:  1


Gait Assistive Device:  FWW


Pt was educated on using a swing through gait pattern.  she was not able to 

swing the right leg through due to (L) leg pain and weakness during stance on 

the (L).  Walked distances of 100 ft and 150 ft with FWW and CGA.





Assessment


Pt continues to have weakness in the (L) LE following hip fx.  She will benefit 

from therapy to improve safety and functional mobility.





PT Short Term Goals


Short Term Goals


Time Frame:  2017


Gait (FIM):  4 (met 17)


Distance (FIM):  3=150 ft


Gait Assistive Device:  FWW


Stairs (FIM):  2 (met 2/10/17)


# of Steps:  4





PT Long Term Goals


Long Term Goals


PT Long Term Goals Time Frame:  2017


Transfers (B,C,W/C) (FIM):  7


Sit to Lying (QC):  6


Lying-Sitting on Side/Bed(QC):  6


Sit to Stand (QC):  6


Rollin


Roll Left to Right (QC):  6


Chair/Bed-to-Chair Xfer(QC):  6


Car Transfer (QC):  5


Does the Patient Walk:  Yes


Gait (FIM):  6


Gait distance (FIM):  3=150 ft


Walk 10 feet (QC):  6


Walk 10ft-Uneven Surface(QC):  6


Walk 50ft with 2 Turns (QC):  6


Walk 150 ft (QC):  6


Gait Level of Assist:  6


Gait Assistive Device:  FWW


Does the Pt use WC or Scooter?:  No


Stairs (FIM):  5


# of Steps:  8 (household distance)


1 Step (curb) (QC):  6


4 Steps (QC):  6


12 Steps (QC):  88 (no goal set; not indicated)


Stairs Level Of Assist:  6


Picking up an Object (QC):  5 (supervision)





PT Plan


Treatment/Plan


Treatment Plan:  Continue Plan of Care


Treatment Plan:  Bed Mobility, Education, Functional Activity [place], Functional 

Strength, Group Therapy, Gait, Safety, Therapeutic Exercise, Transfers


Treatment Duration:  2017


Visits Per Week:  10-15


Minutes/Day (M-F):  60-90


Minutes/Day (Sat/Cardona):  prn





Time/GCodes


Time In:  940


Time Out:  1000


Total Billed Treatment Time:  20


Total Billed Treatment


visit, gait 20min








FAUSTINO FISH PT 2017 13:23

## 2017-02-12 VITALS — DIASTOLIC BLOOD PRESSURE: 84 MMHG | SYSTOLIC BLOOD PRESSURE: 156 MMHG

## 2017-02-12 VITALS — DIASTOLIC BLOOD PRESSURE: 77 MMHG | SYSTOLIC BLOOD PRESSURE: 159 MMHG

## 2017-02-12 LAB
BILIRUB UR QL STRIP: NEGATIVE
HYALINE CASTS #/AREA URNS LPF: (no result) /LPF
KETONES UR QL STRIP: NEGATIVE
LEUKOCYTE ESTERASE UR QL STRIP: NEGATIVE
NITRITE UR QL STRIP: NEGATIVE
PH UR STRIP: 8 [PH] (ref 5–9)
PROT UR QL STRIP: NEGATIVE
SP GR UR STRIP: 1.01 (ref 1.02–1.02)
SQUAMOUS #/AREA URNS HPF: (no result) /HPF
UROBILINOGEN UR-MCNC: NORMAL MG/DL
WBC #/AREA URNS HPF: (no result) /HPF

## 2017-02-12 RX ADMIN — Medication SCH MG: at 17:41

## 2017-02-12 RX ADMIN — LEVOTHYROXINE SODIUM SCH MCG: 125 TABLET ORAL at 06:03

## 2017-02-12 RX ADMIN — Medication SCH MG: at 06:03

## 2017-02-12 RX ADMIN — OMEGA-3 FATTY ACIDS CAP 1000 MG SCH MG: 1000 CAP at 06:03

## 2017-02-12 RX ADMIN — DOCUSATE SODIUM AND SENNOSIDES SCH EA: 8.6; 5 TABLET, FILM COATED ORAL at 19:59

## 2017-02-12 RX ADMIN — ASPIRIN SCH MG: 81 TABLET ORAL at 08:17

## 2017-02-12 RX ADMIN — DOCUSATE SODIUM AND SENNOSIDES SCH EA: 8.6; 5 TABLET, FILM COATED ORAL at 08:17

## 2017-02-12 RX ADMIN — Medication SCH EA: at 06:03

## 2017-02-13 VITALS — DIASTOLIC BLOOD PRESSURE: 81 MMHG | SYSTOLIC BLOOD PRESSURE: 149 MMHG

## 2017-02-13 VITALS — DIASTOLIC BLOOD PRESSURE: 90 MMHG | SYSTOLIC BLOOD PRESSURE: 167 MMHG

## 2017-02-13 RX ADMIN — Medication SCH MG: at 17:50

## 2017-02-13 RX ADMIN — DOCUSATE SODIUM AND SENNOSIDES SCH EA: 8.6; 5 TABLET, FILM COATED ORAL at 20:57

## 2017-02-13 RX ADMIN — DOCUSATE SODIUM AND SENNOSIDES SCH EA: 8.6; 5 TABLET, FILM COATED ORAL at 08:23

## 2017-02-13 RX ADMIN — Medication SCH MG: at 06:06

## 2017-02-13 RX ADMIN — LEVOTHYROXINE SODIUM SCH MCG: 125 TABLET ORAL at 06:06

## 2017-02-13 RX ADMIN — ASPIRIN SCH MG: 81 TABLET ORAL at 08:24

## 2017-02-13 RX ADMIN — Medication SCH EA: at 06:06

## 2017-02-13 RX ADMIN — OMEGA-3 FATTY ACIDS CAP 1000 MG SCH MG: 1000 CAP at 06:06

## 2017-02-13 NOTE — PHYSICAL THERAPY DAILY NOTE
PT Daily Note-Current


Subjective


Agrees to PT.  Reports she thinks the nurse does not like her.  Pt's sister 

reports she (patient) has been having hallucinations and (patient) thinks the 

nurse is standing behind her listening to her conversations.





Transfers


              Functional [place] Measure


0=Not Assessed/NA   4=Minimal Assistance


1=Total Assistance   5=Supervision or Setup


2=Maximal Assistance   6=Modified [place]


3=Moderate Assistance   7=Complete IndependenceIRFPAI Quality Coding Scale











6 Independent with activity with or without an assistive device


 


5  Patient requires set up or clean up by helper.  Patient completes activity  

by  themselves


 


4 Supervision or touching assist (CGA). [place] provide cues , steadying assist


 


3 The helper provides less than half the effort to complete the activity


 


2 The helper provides more than half the effort to complete the activity


 


1 Dependent.  The helper does all the effort to complete an activity 


 


7 Patient refused to complete or attempt activity


 


9 The patient did not perform the activity before the current illness or injury


 


88 Not attempted due to Medical conditions or safety concerns








Pt is SBA with all sit to stand transfers with 1 reminder to push up from the 

chair.  Pt performed sit to stand x 5 reps.  Pt able to perform toiltet 

transfer with SBA and pericare with SBA.





Gait Training


Pt ambulated 150 ft x 2 and 50 ft x 1 with FWW with SBA.  Narrow DIONE and 

demosntrated some heel toe with turning; able to slightly widen DIONE with cues 

but quickly returned to the narrow base.  Focused on safety with gait and 

education on safety with gait.





Exercises


NuStep Minutes:  15 (To increase LE strength to improve functional act 

tolerance and safety. )


 NuStep Workload:  2





Assessment


Current Status:  Good Progress


Gait DIONE is of concern for fall risk and specifically with turns.  Pt did not 

experience a LOB episode but it seems inevitable with her gait.





PT Short Term Goals


Short Term Goals


Time Frame:  2017


Gait (FIM):  4 (met 17)


Distance (FIM):  3=150 ft


Gait Assistive Device:  FWW


Stairs (FIM):  2 (met 2/10/17)


# of Steps:  4





PT Long Term Goals


Long Term Goals


PT Long Term Goals Time Frame:  2017


Transfers (B,C,W/C) (FIM):  7


Sit to Lying (QC):  6


Lying-Sitting on Side/Bed(QC):  6


Sit to Stand (QC):  6


Rollin


Roll Left to Right (QC):  6


Chair/Bed-to-Chair Xfer(QC):  6


Car Transfer (QC):  5


Does the Patient Walk:  Yes


Gait (FIM):  6


Gait distance (FIM):  3=150 ft


Walk 10 feet (QC):  6


Walk 10ft-Uneven Surface(QC):  6


Walk 50ft with 2 Turns (QC):  6


Walk 150 ft (QC):  6


Gait Level of Assist:  6


Gait Assistive Device:  FWW


Does the Pt use WC or Scooter?:  No


Stairs (FIM):  5


# of Steps:  8 (household distance)


1 Step (curb) (QC):  6


4 Steps (QC):  6


12 Steps (QC):  88 (no goal set; not indicated)


Stairs Level Of Assist:  6


Picking up an Object (QC):  5 (supervision)





PT Plan


Problem List


Problem List:  Activity Tolerance, Functional Strength, Safety, Gait, Transfer





Treatment/Plan


Treatment Plan:  Continue Plan of Care


Treatment Plan:  Bed Mobility, Education, Functional Activity [place], Functional 

Strength, Group Therapy, Gait, Safety, Therapeutic Exercise, Transfers


Treatment Duration:  2017


Visits Per Week:  10-15


Minutes/Day (M-F):  60-90


Minutes/Day (Sat/Cardona):  prn





Time/GCodes


Time In:  1320


Time Out:  1351


Total Billed Treatment Time:  31


Total Billed Treatment


visit


EX 15


GT 16








BRE SNYDER PT 2017 14:02

## 2017-02-13 NOTE — PHYSICAL THERAPY DAILY NOTE
PT Daily Note-Current


Subjective


Agreeable to PT.  While in gym, pt thought she saw a boat then decided it was a 

motorcycle in the gym area.  Redirected pt to the object being the Nu Step.





Pain





   Numeric Pain Scale:  0-No Pain


   Location:  No Pain Reported





Transfers


              Functional [place] Measure


0=Not Assessed/NA   4=Minimal Assistance


1=Total Assistance   5=Supervision or Setup


2=Maximal Assistance   6=Modified [place]


3=Moderate Assistance   7=Complete IndependenceIRFPAI Quality Coding Scale











6 Independent with activity with or without an assistive device


 


5  Patient requires set up or clean up by helper.  Patient completes activity  

by  themselves


 


4 Supervision or touching assist (CGA). [place] provide cues , steadying assist


 


3 The helper provides less than half the effort to complete the activity


 


2 The helper provides more than half the effort to complete the activity


 


1 Dependent.  The helper does all the effort to complete an activity 


 


7 Patient refused to complete or attempt activity


 


9 The patient did not perform the activity before the current illness or injury


 


88 Not attempted due to Medical conditions or safety concerns








Transfers (B, C, W/C) (FIM):  5


Rollin


Roll Left to Right (QC):  5


Supine to/from Sit:  5


Sit to/from Stand:  5


Sit to Lying (QC):  5


Sit to Stand (QC):  5


Chair/Bed-to-Chair Xfer(QC):  5


Pt is SBA with all functional transfers for safety reasons and decreased safety 

awareness.  Recommend supervision for safety.





Gait Training


Does the Patient Walk?:  Yes


Gait (FIM):  5


Distance (FIM):  3=150 ft


Distance:  150 ft x 2; 50 ft x 2


Gait Assistive Device:  FWW


SBA with gait and skilled cues for step through pattern and to roll the walker 

instead of picking it up.  Pt able to follow cues and carry through.





Stair Training


 Stair Training: Handrails/:  2 handrails


Stairs (FIM):  2


#of Steps:  8


Stairs:  Pattern:  Step to


Level of Assist:  4 (CGA and occas cues for sequencing. )





Exercises


Seated Therapy Exercises:  Ankle pumps, Sit to stand, Long arc quads, Hip 

flexion, Hamstring Curls, Hip abd/add


Seated Reps:  15 (To increase functional strength for safety and mobility)


Standing:  Hamstring curls, Heel/toe raises, Marching, Mini squats


Standing Reps:  15 (to facilitate LE strength to promote functional strength 

for gait and transfer. )





Assessment


Current Status:  Good Progress


Seems a bit vague today and needs more cues to find the gym and for sequencing.

  Confused the nu step for a boat and a motor cycle.  Pleasant and cooperative. 

Safety awareness is of concern as it does not seem to be improving.





PT Short Term Goals


Short Term Goals


Time Frame:  2017


Gait (FIM):  4 (met 17)


Distance (FIM):  3=150 ft


Gait Assistive Device:  FWW


Stairs (FIM):  2 (met 2/10/17)


# of Steps:  4





PT Long Term Goals


Long Term Goals


PT Long Term Goals Time Frame:  2017


Transfers (B,C,W/C) (FIM):  7


Sit to Lying (QC):  6


Lying-Sitting on Side/Bed(QC):  6


Sit to Stand (QC):  6


Rollin


Roll Left to Right (QC):  6


Chair/Bed-to-Chair Xfer(QC):  6


Car Transfer (QC):  5


Does the Patient Walk:  Yes


Gait (FIM):  6


Gait distance (FIM):  3=150 ft


Walk 10 feet (QC):  6


Walk 10ft-Uneven Surface(QC):  6


Walk 50ft with 2 Turns (QC):  6


Walk 150 ft (QC):  6


Gait Level of Assist:  6


Gait Assistive Device:  FWW


Does the Pt use WC or Scooter?:  No


Stairs (FIM):  5


# of Steps:  8 (household distance)


1 Step (curb) (QC):  6


4 Steps (QC):  6


12 Steps (QC):  88 (no goal set; not indicated)


Stairs Level Of Assist:  6


Picking up an Object (QC):  5 (supervision)





PT Plan


Problem List


Problem List:  Activity Tolerance, Functional Strength, Safety, Gait, Transfer





Treatment/Plan


Treatment Plan:  Continue Plan of Care


Treatment Plan:  Bed Mobility, Education, Functional Activity [place], Functional 

Strength, Group Therapy, Gait, Safety, Therapeutic Exercise, Transfers


Treatment Duration:  2017


Visits Per Week:  10-15


Minutes/Day (M-F):  60-90


Minutes/Day (Sat/Cardona):  prn





Safety Risks/Education


Patient Education:  Transfer Techniques, Safety Issues


Teaching Recipient:  Patient


Teaching Methods:  Discussion


Response to Teaching:  Reinforcement Needed





Time/GCodes


Time In:  1030


Time Out:  1130


Total Billed Treatment Time:  60


Total Billed Treatment


visit


EX 30


GT 30








BRE SNYDER PT 2017 11:37

## 2017-02-13 NOTE — SPEECH THERAPY DAILY NOTE
Speech Daily Progress Note


Subjective


The patient was seated upright in recliner upon entrance. The patient 

demonstrated increased confusion on this date, continuing to mumble statements 

regarding someone who was unhappy with her, as well as, her decreased knowledge 

on "illegal" practices. The patient's RN was present and aware of the patient's 

confusion level for this date. Per RN, the patient overheard a neighboring 

patient's elevated volume and believed it was associated with her (which was 

not the case).





Objective


The patient was re-administered the [place] Cognitive Assessment (MoCA) to 

assess for improvement





- Executive Functioning/ Visuospatial: The patient was able to accurately draw 

a contour of a clock, however, could not provide numbers or transcribe the 

accurate time. The patient was unable to complete trail-making or cube copying.


- Naming: The patient was able to accurately identify three of three pictures.


- Memory: The patient was able to immediately recall two of five single words. 

The patient was unable to recall any of five single words following a five 

minute interval.


- Attention: The patient was able to repeat five digits immediately, however, 

was unable and identify a specific letter or complete serial subtraction.


- Language: The patient was unable to repeat short phrases immediately.


- Abstraction: The patient was unable to identify a similarity between two 

single words with 50% accuracy.


- Orientation: The patient was oriented to year, month, and day of week. The 

patient stated the date was the , the place was a cemetery, and the city 

was [place].





The patient demonstrated a result of +10/30 which correlates to a moderate to 

severe cognitive impairment. The patient's results are similar to the previous 

week.





Assessment


Assessment Current Status:  Poor Progress





Treatment Plan


Continue Plan of Care





Communication


Comprehension:  3


Expression:  4





Social Cognition


Social Interaction:  5


Problem Solving:  3


Memory:  2





Speech Short Term Goals


Short Term Goals


Short Term Goals


1. The patient will recall and demonstrate three functional memory strategies.


2. The patient will display 80% accuracy with safety problem solving with mild 

clinician cueing.


3. The patient will demonstrated 90% accuracy (independently) with simple 

orientation information.


Time Frame-STG:  Two Weeks





Speech Long Term Goals


Long Term Goals


1. The patient will demonstrate improved cognition for increased safety and 

function with ADL's in the least restrictive setting.


Time Frame:  Three Weeks


Comprehension:  4


Expression:  5


Social Interaction:  5


Problem Solvin


Memory:  4





Speech-Plan


Treatment Plan


Speech Therapy Treatment Plan:  Continue Plan of Care


Continue skilled cognitive therapy to target functional problem solving and 

memory strategies.


Treatment Duration:  2017


# of days/week


Four to five.


Visits Per Week:  Four to Five


Minutes/Day (M-F):  30


Rehab Potential:  Fair





Safety Risks/Education


Teaching Recipient:  Patient


Teaching Methods:  Discussion


Response to Teaching:  Unable to Comprehend, Reinforcement Needed


Education Topics Provided:  


Orientation Strategies





Time


Speech Therapy Time In:  08:15


Speech Therapy Time Out:  08:45


Total Billed Time:  30


Billed Treatment Time


1, MILDRED MADRIGAL 2017 13:37

## 2017-02-13 NOTE — PM & R (SOAP) PROGRESS NOTE
Subjective


Subjective/Events-last exam


Patient was seen in her rooom this evening Discussed case with RN Patient 

having hallucinations at times Meds reviewed Only med contributing to this 

possible is Tramadol GeriPsych to see


Review of Systems


Neurological:  : Confusion





Objective


Exam


Last Set of Vital Signs





Vital Signs








  Date Time  Temp Pulse Resp B/P Pulse Ox O2 Delivery O2 Flow Rate FiO2


 


2/13/17 09:00      Room Air  


 


2/13/17 05:17 99.5 96 20 167/90 96   





Capillary Refill : Less Than 3 Seconds


I&O











 Intake and Output 


 


 2/13/17





 00:00


 


Intake Total 972 ml


 


Balance 972 ml


 


 


 


Intake Oral 972 ml


 


# Voids 9


 


# Bowel Movements 2








General:  Alert, Cooperative, No Acute Distress


HEENT:  Atraumatic, PERRLA, EOMI, Mucous Memb Moist/Pink


Neck:  Supple, No JVD


Lungs:  Clear to Auscultation


Heart:  Regular Rate


Abdomen:  Normal Bowel Sounds, Soft, No Tenderness


Extremities:  Other (trace edema left ankle)


Neuro:  Other (Impaired strength prox left hip Cognition mildly impaired)





Results


Lab


 Laboratory Tests


2/12/17 15:15: 


Urine Bacteria NEGATIVE, Urine Bilirubin NEGATIVE, Urine Casts PRESENT, Urine 

Clarity CLEAR, Urine Color YELLOW, Urine Crystals NONE, Urine Culture Indicated 

NO, Urine Glucose (UA) NEGATIVE, Urine Hyaline Casts RARE, Urine Ketones 

NEGATIVE, Urine Leukocyte Esterase NEGATIVE, Urine Mucus NEGATIVE, Urine 

Nitrite NEGATIVE, Urine Protein NEGATIVE, Urine RBC NONE, Urine RBC (Auto) 2+H, 

Urine Specific Gravity 1.015L, Urine Squamous Epithelial Cells RARE, Urine 

Urobilinogen NORMAL, Urine WBC NONE, Urine pH 8





Assessment/Plan


Assessment


Fall /frx left hip s/p repair DR Garza WBAT


Prior fall with rt hip frx repair


Mild dementia


Presumed osteoporosis


Depression 


Constipation


Hypothyroidism


Postop anemia -improved s/p transfusion


Hallucinations reported may be multifactorial





Plan


Continue PT/OT


F/U with Hospitalist and ortho PRn


Michelle Psych to see


Next Team Conference 2/15/17








EULA MARTINEZ MD Feb 13, 2017 20:05

## 2017-02-13 NOTE — OCCUPATIONAL THER DAILY NOTE
OT Current Status-Daily Note


Subjective


Pt alert, sitting in recliner.  Pt just finished up with SLP.  Pt stated that 

she was thinking about a lot of things.  Pt appeared anxious and stressed.  Pt 

stated that she was worrying about someone having a grudge and she didn't know 

why she would.  Pt then stated that she would like to have a bonfire and eat 

hotdogs for dinner today.  She did state that she was at the [facility].  Pt agreed to therapy.  No c/o pain at this time.





Mental Status/Objective


Patient Orientation:  Person


              Functional [place] Measure


0=Not Assessed/NA   4=Minimal Assistance


1=Total Assistance   5=Supervision or Setup


2=Maximal Assistance   6=Modified [place]


3=Moderate Assistance   7=Complete [place]





ADL-Treatment


              Functional [place] Measure


0=Not Assessed/NA   4=Minimal Assistance


1=Total Assistance   5=Supervision or Setup


2=Maximal Assistance   6=Modified [place]


3=Moderate Assistance   7=Complete IndependenceIRFPAI Quality Coding Scale











6 Independent with activity with or without an assistive device


 


5  Patient requires set up or clean up by helper.  Patient completes activity  

by  themselves


 


4 Supervision or touching assist (CGA). [place] provide cues , steadying assist


 


3 The helper provides less than half the effort to complete the activity


 


2 The helper provides more than half the effort to complete the activity


 


1 Dependent.  The helper does all the effort to complete an activity 


 


7 Patient refused to complete or attempt activity


 


9 The patient did not perform the activity before the current illness or injury


 


88 Not attempted due to Medical conditions or safety concerns








Grooming (FIM):  5 (Standing at sink with SBA using FWW, pt is able to complete 

grooming.)


Bathing (FIM):  5 (Using shower bench, grabbars and hand held shower pt is able 

to complete bathing with supervision.)


Upper Body (FIM):  5 (After set up, pt is able to complete own dressing.)


Lower Body Dressing (FIM):  5 (After set up, pt is able to complete own 

dressing with SBA when standing to hike pants over hips.)


Toileting (FIM):  5 (Using grabbars and BSC, pt is able to complete toileting, 

SBA to manipulate clothing in standing.)


Transfers (B, C, W/C) (FIM):  5 (Using FWW, pt is SBA for transfers.)


Toilet/Commode Transfer (FIM):  5 (Using FWW, grabbars and BSC, pt is SBA for 

transfers.)


Shower Transfer(FIM):  5 (Using FWW, grabbars and shower bench, pt is SBA for 

transfers.)


Pt required increased time during ADL's due to pt's anxiety over someone having 

a grudge and how people act.  After therapy, pt requested to lay in bed.  Call 

light/phone in reach.  All needs met in room.





OT Short Term Goals


Short Term Goals


Time Frame:  2017


Bathing(FIM):  5


Toileting(FIM):  5


Toilet/Commode Transfer(FIM):  5


Shower Transfer(FIM):  5


Additional Short Term Goals:  2-Verbalize Understanding, 3-ImproveStrength/Evans


1=Demonstrate adherence to instructed precautions during ADL tasks.


2=Patient will verbalize/demonstrate understanding of assistive devices/

modifications for ADL.


3=Patient will improve strength/tolerance for activity to enable patient to 

perform ADL's.





OT Long Term Goals


Long Term Goals


Time Frame:  2017


Eating (FIM):  6


Eating (QC):  6


Groomin


Oral Hygiene (QC):  6


Bathing(FIM):  6


Shower/Bathe Self (QC):  6


Upper Body Dressing(FIM):  6


Upper Body Dressing (QC):  6


Lower Body Dressing(FIM):  6


Lower Body Dressing (QC):  6


On/Off Footwear (QC):  6


Toileting(FIM):  6


Toileting Hygiene (QC):  6


Toilet/Commode Transfer(FIM):  6


Toilet/Commode Transfer (QC):  6


Comprehension(FIM):  4


Expression (FIM):  5


Social Interaction(FIM):  5


Problem Solving(FIM):  4


Memory(FIM):  4


Additional Goals:  2-Verbalize Understanding, 3-ImproveStrength/Evans


1=Demonstrate adherence to instructed precautions during ADL tasks.


2=Patient will verbalize/demonstrate understanding of assistive devices/

modifications for ADL.


3=Patient will improve strength/tolerance for activity to enable patient to 

perform ADL's.





OT Education/Plan


Problem List/Assessment


Pt would benefit from skilled OT to increase her independence in basic self 

care to allow her to safely return to her home to live independently and to 

decrease caregiver burden





Discharge Recommendations


Plan/Recommendations:  Continue POC





Treatment Plan/Plan of Care


Patient would benefit from OT for education, treatment and training to promote 

independence in ADL's, mobility, safety and/or upper extremity function for ADL'

s.


Plan of Care:  ADL Retraining, Functional Mobility, Group Exercise/Act as Ind (

education, exercise, activity tolerance, memory, socialization, functional 

activities), UE Funct Exercise/Act, UE Neuromus Re-Ed/Coord


Treatment Duration:  2017


Visits Per Week:  10-11


Minutes/Day (M-F):  75-90


Minutes/Day (Sat/Cardona):  PRN


Agreement:  Yes


Rehab Potential:  Fair





Time/GCodes


Start Time:  08:45


Stop Time:  10:00


Total Time Billed (hr/min):  75


Billed Treatment Time


1 visit-ADL 4 (60 min)  FA 1(15 min)








BRE PLAZA 2017 12:03

## 2017-02-14 VITALS — DIASTOLIC BLOOD PRESSURE: 72 MMHG | SYSTOLIC BLOOD PRESSURE: 132 MMHG

## 2017-02-14 VITALS — SYSTOLIC BLOOD PRESSURE: 150 MMHG | DIASTOLIC BLOOD PRESSURE: 75 MMHG

## 2017-02-14 LAB
ALBUMIN SERPL-MCNC: 4.1 G/DL (ref 3.2–4.5)
ALT SERPL-CCNC: 13 U/L (ref 0–55)
ANION GAP SERPL CALC-SCNC: 12 MMOL/L (ref 5–14)
AST SERPL-CCNC: 17 U/L (ref 5–34)
BASOPHILS # BLD AUTO: 0.1 10^3/UL (ref 0–0.1)
BASOPHILS NFR BLD AUTO: 1 % (ref 0–10)
BILIRUB SERPL-MCNC: 0.9 MG/DL (ref 0.1–1)
BUN SERPL-MCNC: 20 MG/DL (ref 7–18)
BUN/CREAT SERPL: 24
CALCIUM SERPL-MCNC: 8.5 MG/DL (ref 8.5–10.1)
CHLORIDE SERPL-SCNC: 101 MMOL/L (ref 98–107)
CO2 SERPL-SCNC: 24 MMOL/L (ref 21–32)
CREAT SERPL-MCNC: 0.82 MG/DL (ref 0.6–1.3)
EOSINOPHIL # BLD AUTO: 0.8 10^3/UL (ref 0–0.3)
EOSINOPHIL NFR BLD AUTO: 9 % (ref 0–10)
ERYTHROCYTE [DISTWIDTH] IN BLOOD BY AUTOMATED COUNT: 14.5 % (ref 10–14.5)
GFR SERPLBLD BASED ON 1.73 SQ M-ARVRAT: > 60 ML/MIN
GLUCOSE SERPL-MCNC: 162 MG/DL (ref 70–105)
LYMPHOCYTES # BLD AUTO: 1.8 X 10^3 (ref 1–4)
LYMPHOCYTES NFR BLD AUTO: 21 % (ref 12–44)
MCH RBC QN AUTO: 30 PG (ref 25–34)
MCHC RBC AUTO-ENTMCNC: 32 G/DL (ref 32–36)
MCV RBC AUTO: 94 FL (ref 80–99)
MONOCYTES # BLD AUTO: 0.8 X 10^3 (ref 0–1)
MONOCYTES NFR BLD AUTO: 9 % (ref 0–12)
NEUTROPHILS # BLD AUTO: 5.2 X 10^3 (ref 1.8–7.8)
NEUTROPHILS NFR BLD AUTO: 60 % (ref 42–75)
PLATELET # BLD: 468 10^3/UL (ref 130–400)
PMV BLD AUTO: 9.5 FL (ref 7.4–10.4)
POTASSIUM SERPL-SCNC: 3.9 MMOL/L (ref 3.6–5)
PROT SERPL-MCNC: 6.5 G/DL (ref 6.4–8.2)
RBC # BLD AUTO: 3.84 10^6/UL (ref 4.35–5.85)
SODIUM SERPL-SCNC: 137 MMOL/L (ref 135–145)
WBC # BLD AUTO: 8.6 10^3/UL (ref 4.3–11)

## 2017-02-14 RX ADMIN — Medication SCH MG: at 06:20

## 2017-02-14 RX ADMIN — Medication SCH MG: at 17:13

## 2017-02-14 RX ADMIN — OMEGA-3 FATTY ACIDS CAP 1000 MG SCH MG: 1000 CAP at 06:20

## 2017-02-14 RX ADMIN — ASPIRIN SCH MG: 81 TABLET ORAL at 08:35

## 2017-02-14 RX ADMIN — Medication SCH EA: at 06:20

## 2017-02-14 RX ADMIN — DOCUSATE SODIUM AND SENNOSIDES SCH EA: 8.6; 5 TABLET, FILM COATED ORAL at 08:35

## 2017-02-14 RX ADMIN — DOCUSATE SODIUM AND SENNOSIDES SCH EA: 8.6; 5 TABLET, FILM COATED ORAL at 20:40

## 2017-02-14 RX ADMIN — LEVOTHYROXINE SODIUM SCH MCG: 125 TABLET ORAL at 06:20

## 2017-02-14 NOTE — PHYSICAL THERAPY DAILY NOTE
PT Daily Note-Current


Subjective


"I know who you are, you do not need to introduce yourself."  "Just so you know

, I am not crazy."  


Pt had a consult from [facility] this morning and seems to be upset by it.  As 

treatment progressed, she became less upset.





Pain





   Numeric Pain Scale:  7


   Location:  Left


   Location Body Site:  Hip


   Pain Description:  Ache





Mental Status


Patient Orientation:  Person, Confused (slightly vague, guarded), Place, Time, 

Situation





Transfers


              Functional [place] Measure


0=Not Assessed/NA   4=Minimal Assistance


1=Total Assistance   5=Supervision or Setup


2=Maximal Assistance   6=Modified [place]


3=Moderate Assistance   7=Complete IndependenceIRFPAI Quality Coding Scale











6 Independent with activity with or without an assistive device


 


5  Patient requires set up or clean up by helper.  Patient completes activity  

by  themselves


 


4 Supervision or touching assist (CGA). [place] provide cues , steadying assist


 


3 The helper provides less than half the effort to complete the activity


 


2 The helper provides more than half the effort to complete the activity


 


1 Dependent.  The helper does all the effort to complete an activity 


 


7 Patient refused to complete or attempt activity


 


9 The patient did not perform the activity before the current illness or injury


 


88 Not attempted due to Medical conditions or safety concerns








Pt was able to transfer sup to sit EOB mod indep, slowly but no assist.  Sit to 

stand SBA with FWW and skilled cues for hand placement.  No noted LOB with 

transfer.  Pt transferrred sit to stand x 10 reps for functional strength and 

sequence training.





Gait Training


Gait (FIM):  5


Pt ambulated x 250 ft with FWW with SBA with improved foot stance width and no 

noted scissoring with turns this date. Pt then ambuulated x 50 ft x 2 and 150 

ft x 1.





Stair Training


 Stair Training: Handrails/:  2 handrails


Stairs (FIM):  2


#of Steps:  8


Stairs:  Pattern:  Step to


Pt ambulated up/down 8n steps with CGA and skilled cues to sequence; pt 

performed the proper sequence 75% of the time.





Exercises


Seated Therapy Exercises:  Ankle pumps, Sit to stand, Long arc quads, Hip 

flexion, Hamstring Curls


Seated Reps:  15 (For improved safety with gait and transfers,.  )


NuStep Minutes:  15 (For improved cardiovascular act toelrance and strength for 

increased safety wtih gait and transfer.s )





Assessment


Current Status:  Good Progress


Pt upset by fina psych visit this morning; by end of treatment, was less 

agitated.  Imprved gait today and did well with transfers.





PT Short Term Goals


Short Term Goals


Time Frame:  2017


Gait (FIM):  4 (met 17)


Distance (FIM):  3=150 ft


Gait Assistive Device:  FWW


Stairs (FIM):  2 (met 2/10/17)


# of Steps:  4





PT Long Term Goals


Long Term Goals


PT Long Term Goals Time Frame:  2017


Transfers (B,C,W/C) (FIM):  7


Sit to Lying (QC):  6


Lying-Sitting on Side/Bed(QC):  6


Sit to Stand (QC):  6


Rollin


Roll Left to Right (QC):  6


Chair/Bed-to-Chair Xfer(QC):  6


Car Transfer (QC):  5


Does the Patient Walk:  Yes


Gait (FIM):  6


Gait distance (FIM):  3=150 ft


Walk 10 feet (QC):  6


Walk 10ft-Uneven Surface(QC):  6


Walk 50ft with 2 Turns (QC):  6


Walk 150 ft (QC):  6


Gait Level of Assist:  6


Gait Assistive Device:  FWW


Does the Pt use WC or Scooter?:  No


Stairs (FIM):  5


# of Steps:  8 (household distance)


1 Step (curb) (QC):  6


4 Steps (QC):  6


12 Steps (QC):  88 (no goal set; not indicated)


Stairs Level Of Assist:  6


Picking up an Object (QC):  5 (supervision)





PT Plan


Problem List


Problem List:  Activity Tolerance, Functional Strength, Safety





Treatment/Plan


Treatment Plan:  Continue Plan of Care


Treatment Plan:  Bed Mobility, Education, Functional Activity [place], Functional 

Strength, Group Therapy, Gait, Safety, Therapeutic Exercise, Transfers


Treatment Duration:  2017


Visits Per Week:  10-15


Minutes/Day (M-F):  60-90


Minutes/Day (Sat/Cardona):  prn





Time/GCodes


Time In:  1015


Time Out:  1100


Total Billed Treatment Time:  45


Total Billed Treatment


visit


GT 30


EX 15








BRE SNYDER PT 2017 11:22

## 2017-02-14 NOTE — SPEECH THERAPY DAILY NOTE
Speech Daily Progress Note


Subjective


The patient was seated upright in recliner upon entrance. The patient greeted 

the clinician appropriately and agreed to participate in cognitive therapy on 

this date.





Objective





Functional Memory: The patient was read short paragraphs (three to four 

sentences in length) and asked to recall specific information asked by the 

clinician immediately following. The paragraph was read three times with the 

question presented following the first oral reading. The patient demonstrated 

slightly improved accuracy on this date (fair to good) displaying 80% accuracy 

with moderate clinician cueing (repetition).





Assessment


Assessment Current Status:  Fair Progress





Treatment Plan


Continue Plan of Care





Communication


Comprehension:  3


Expression:  4





Social Cognition


Social Interaction:  5


Problem Solvin


Memory:  3





Speech Short Term Goals


Short Term Goals


Short Term Goals


1. The patient will recall and demonstrate three functional memory strategies.


2. The patient will display 80% accuracy with safety problem solving with mild 

clinician cueing.


3. The patient will demonstrated 90% accuracy (independently) with simple 

orientation information.


Time Frame-STG:  Two Weeks





Speech Long Term Goals


Long Term Goals


1. The patient will demonstrate improved cognition for increased safety and 

function with ADL's in the least restrictive setting.


Time Frame:  Three Weeks


Comprehension:  4


Expression:  5


Social Interaction:  5


Problem Solvin


Memory:  4





Speech-Plan


Treatment Plan


Speech Therapy Treatment Plan:  Continue Plan of Care


Continue skilled cognitive therapy to focus on functional memory strategies.


Treatment Duration:  2017


# of days/week


Four to five.


Visits Per Week:  Four to Five


Minutes/Day (M-F):  30


Rehab Potential:  Fair





Safety Risks/Education


Teaching Recipient:  Patient


Teaching Methods:  Discussion


Response to Teaching:  Reinforcement Needed


Education Topics Provided:  


Functional  Memory Strategies





Time


Speech Therapy Time In:  11:00


Speech Therapy Time Out:  11:30


Total Billed Time:  30


Billed Treatment Time


Jose MiguelEVANAOMI








[address]DR[address] ST 2017 11:50

## 2017-02-14 NOTE — THERAPY GROUP DAILY NOTE
Therapy Daily Group Note


Patient Education Topic


Other List Below (Handwashing, ARU description/expectations)





Exercises


Balance, Sit to/from Stand, Walking, Fine Motor, UE Exercise





Other/Notes


Pt ambulated to OT/PT group using FWW with CGA.  Group consisted of 

introductions (name, place living, romantic valentines ideas), socialization, 

education on handwashing, completed handwashing at sink, fine motor tasks, 

inspirational words and critical word finding tasks.  Pt actively participated 

in group by contributing to discussions and answer questions appropriately.  Pt 

ambulated to sink to wash hands, SBA for standing at sink and washed hands by 

self.  Pt was able to complete icing/decorating cookies with good dexterity/

coordination using hands during fine motor tasks.  After group, pt lying in bed 

with call light/phone in reach.  All needs met in room.


Start Time:  13:00


Stop Time:  14:10


Total Billed Treatment Time:  70


Total Billed Treatment


1-GRP








BRE PLAZA Feb 14, 2017 14:38

## 2017-02-14 NOTE — PM & R (SOAP) PROGRESS NOTE
Subjective


Subjective/Events-last exam


Patient was seen in her room this AM Discussed case with Staff Patient 

inappropriate at times Ceci has evaluated Patient is participating in 

therapies Discussed case with ELLIOT and with RN.ELLIOT indicates that patients 

daughter will come on 2/1/5/17 and take patient home with her.. Will recheck 

labs as well Patient SBA for gait with walker





Objective


Exam


Last Set of Vital Signs





Vital Signs








  Date Time  Temp Pulse Resp B/P Pulse Ox O2 Delivery O2 Flow Rate FiO2


 


2/14/17 09:00      Room Air  


 


2/14/17 04:21 97.7 67 16 132/72 96   





Capillary Refill : Less Than 3 Seconds


I&O











 Intake and Output 


 


 2/14/17





 00:00


 


Intake Total 1140 ml


 


Balance 1140 ml


 


 


 


Intake Oral 1140 ml


 


# Voids 10


 


# Bowel Movements 3








General:  Alert, Cooperative, No Acute Distress


HEENT:  Atraumatic, PERRLA, EOMI, Mucous Memb Moist/Pink


Neck:  Supple, No JVD


Lungs:  Clear to Auscultation


Heart:  Regular Rate


Abdomen:  Normal Bowel Sounds, Soft, No Tenderness


Extremities:  Other (trace edema left ankle)


Neuro:  Other (Impaired strength prox left hip Cognition mildly impaired)





Results


Lab


 Laboratory Tests


2/12/17 15:15: 


Urine Bacteria NEGATIVE, Urine Bilirubin NEGATIVE, Urine Casts PRESENT, Urine 

Clarity CLEAR, Urine Color YELLOW, Urine Crystals NONE, Urine Culture Indicated 

NO, Urine Glucose (UA) NEGATIVE, Urine Hyaline Casts RARE, Urine Ketones 

NEGATIVE, Urine Leukocyte Esterase NEGATIVE, Urine Mucus NEGATIVE, Urine 

Nitrite NEGATIVE, Urine Protein NEGATIVE, Urine RBC NONE, Urine RBC (Auto) 2+H, 

Urine Specific Gravity 1.015L, Urine Squamous Epithelial Cells RARE, Urine 

Urobilinogen NORMAL, Urine WBC NONE, Urine pH 8





Assessment/Plan


Assessment


Fall /frx left hip s/p repair DR Garza WBAT


Prior fall with rt hip frx repair


Mild dementia


Presumed osteoporosis


Depression 


Constipation


Hypothyroidism


Postop anemia -improved s/p transfusion


Hallucinations reported may be multifactorial





Plan


Continue PT/OT


F/U with Hospitalist and ortho PRn


Michelle xavier seen


Next Team Conference  tomorrow 2/15/17


Recheck labs -see orders


Reviwed meds


Confirm with ELLIOT discharge plan is  to home with daughter








EULA MARTINEZ MD Feb 14, 2017 13:46

## 2017-02-14 NOTE — OCCUPATIONAL THER DAILY NOTE
OT Current Status-Daily Note


Subjective


Pt alert, sitting in recliner.  Pt agreed to therapy.  No c/o pain at this time.





Mental Status/Objective


Patient Orientation:  Person, Place, Time, Situation


              Functional [place] Measure


0=Not Assessed/NA   4=Minimal Assistance


1=Total Assistance   5=Supervision or Setup


2=Maximal Assistance   6=Modified [place]


3=Moderate Assistance   7=Complete [place]





ADL-Treatment


Pt ambulated with SBA using FWW to bathroom.  SBA using FWW, grabbars and BSC 

to transfer on/off toilet, completed hygiene and manipulate clothing.  Pt 

ambulated to sink to complete grooming standing using FWW with SBA.  Pt 

required cues to remember to sit to complete lower body dressing with SBA.  Set 

up only for upper body dressing.


              Functional [place] Measure


0=Not Assessed/NA   4=Minimal Assistance


1=Total Assistance   5=Supervision or Setup


2=Maximal Assistance   6=Modified [place]


3=Moderate Assistance   7=Complete IndependenceIRFPAI Quality Coding Scale











6 Independent with activity with or without an assistive device


 


5  Patient requires set up or clean up by helper.  Patient completes activity  

by  themselves


 


4 Supervision or touching assist (CGA). [place] provide cues , steadying assist


 


3 The helper provides less than half the effort to complete the activity


 


2 The helper provides more than half the effort to complete the activity


 


1 Dependent.  The helper does all the effort to complete an activity 


 


7 Patient refused to complete or attempt activity


 


9 The patient did not perform the activity before the current illness or injury


 


88 Not attempted due to Medical conditions or safety concerns








Grooming (FIM):  5


Oral Hygiene (QC):  4


Upper Body (FIM):  5


Lower Body Dressing (FIM):  5


Toileting (FIM):  5


Transfers (B, C, W/C) (FIM):  5


Toilet/Commode Transfer (FIM):  5





Other Treatment


Pt completed arm bike for 10 min at 20 abreu resistance without breaks to 

increase strength and endurance for daily functional tasks.  Pt tolerated well.

  Pt transported back to room via w/c.  Transferred into bed using FWW with SBA 

then was able to lay down in bed by self.  After therapy, pt lying in bed with 

call light/phone in reach.  All needs met in room.





OT Short Term Goals


Short Term Goals


Time Frame:  2017


Bathing(FIM):  5


Toileting(FIM):  5


Toilet/Commode Transfer(FIM):  5


Shower Transfer(FIM):  5


Additional Short Term Goals:  2-Verbalize Understanding, 3-ImproveStrength/[place]


1=Demonstrate adherence to instructed precautions during ADL tasks.


2=Patient will verbalize/demonstrate understanding of assistive devices/

modifications for ADL.


3=Patient will improve strength/tolerance for activity to enable patient to 

perform ADL's.





OT Long Term Goals


Long Term Goals


Time Frame:  2017


Eating (FIM):  6


Eating (QC):  6


Groomin


Oral Hygiene (QC):  6


Bathing(FIM):  6


Shower/Bathe Self (QC):  6


Upper Body Dressing(FIM):  6


Upper Body Dressing (QC):  6


Lower Body Dressing(FIM):  6


Lower Body Dressing (QC):  6


On/Off Footwear (QC):  6


Toileting(FIM):  6


Toileting Hygiene (QC):  6


Toilet/Commode Transfer(FIM):  6


Toilet/Commode Transfer (QC):  6


Comprehension(FIM):  4


Expression (FIM):  5


Social Interaction(FIM):  5


Problem Solving(FIM):  4


Memory(FIM):  4


Additional Goals:  2-Verbalize Understanding, 3-ImproveStrength/Evans


1=Demonstrate adherence to instructed precautions during ADL tasks.


2=Patient will verbalize/demonstrate understanding of assistive devices/

modifications for ADL.


3=Patient will improve strength/tolerance for activity to enable patient to 

perform ADL's.





OT Education/Plan


Problem List/Assessment


Pt would benefit from skilled OT to increase her independence in basic self 

care to allow her to safely return to her home to live independently and to 

decrease caregiver burden





Discharge Recommendations


Plan/Recommendations:  Continue POC





Treatment Plan/Plan of Care


Patient would benefit from OT for education, treatment and training to promote 

independence in ADL's, mobility, safety and/or upper extremity function for ADL'

s.


Plan of Care:  ADL Retraining, Functional Mobility, Group Exercise/Act as Ind (

education, exercise, activity tolerance, memory, socialization, functional 

activities), UE Funct Exercise/Act, UE Neuromus Re-Ed/Coord


Treatment Duration:  2017


Visits Per Week:  10-11


Minutes/Day (M-F):  75-90


Minutes/Day (Sat/Cardona):  PRN


Agreement:  Yes


Rehab Potential:  Fair





Time/GCodes


Start Time:  08:30


Stop Time:  09:30


Total Time Billed (hr/min):  60


Billed Treatment Time


1 visit-ADL 3 (45 min)  EX 1 (15 min)








BRE PLAZA 2017 09:38

## 2017-02-15 VITALS — DIASTOLIC BLOOD PRESSURE: 80 MMHG | SYSTOLIC BLOOD PRESSURE: 159 MMHG

## 2017-02-15 VITALS — SYSTOLIC BLOOD PRESSURE: 144 MMHG | DIASTOLIC BLOOD PRESSURE: 77 MMHG

## 2017-02-15 RX ADMIN — DOCUSATE SODIUM AND SENNOSIDES SCH EA: 8.6; 5 TABLET, FILM COATED ORAL at 08:06

## 2017-02-15 RX ADMIN — OMEGA-3 FATTY ACIDS CAP 1000 MG SCH MG: 1000 CAP at 06:27

## 2017-02-15 RX ADMIN — Medication SCH MG: at 06:27

## 2017-02-15 RX ADMIN — DOCUSATE SODIUM AND SENNOSIDES SCH EA: 8.6; 5 TABLET, FILM COATED ORAL at 20:53

## 2017-02-15 RX ADMIN — Medication SCH MG: at 17:13

## 2017-02-15 RX ADMIN — LEVOTHYROXINE SODIUM SCH MCG: 125 TABLET ORAL at 06:27

## 2017-02-15 RX ADMIN — Medication SCH EA: at 06:27

## 2017-02-15 RX ADMIN — ASPIRIN SCH MG: 81 TABLET ORAL at 08:06

## 2017-02-15 NOTE — PHYSICAL THERAPY DAILY NOTE
PT Daily Note-Current


Subjective


Pt sitting in recliner upon arrival.  Pt report having pain in L hip of 7/10.  

Pt agrees to PT.





Pain





   Numeric Pain Scale:  7


   Location:  Lateral


   Location Body Site:  Hip


   Pain Description:  Sharp





Mental Status


Patient Orientation:  Person, Place, Time, Situation





Transfers


              Functional [place] Measure


0=Not Assessed/NA   4=Minimal Assistance


1=Total Assistance   5=Supervision or Setup


2=Maximal Assistance   6=Modified [place]


3=Moderate Assistance   7=Complete IndependenceIRFPAI Quality Coding Scale











6 Independent with activity with or without an assistive device


 


5  Patient requires set up or clean up by helper.  Patient completes activity  

by  themselves


 


4 Supervision or touching assist (CGA). Elsie provide cues , steadying assist


 


3 The helper provides less than half the effort to complete the activity


 


2 The helper provides more than half the effort to complete the activity


 


1 Dependent.  The helper does all the effort to complete an activity 


 


7 Patient refused to complete or attempt activity


 


9 The patient did not perform the activity before the current illness or injury


 


88 Not attempted due to Medical conditions or safety concerns








Scootin


Supine to/from Sit:  5


Sit to/from Stand:  5


Sit to Stand (QC):  5


Chair/Bed-to-Chair Xfer(QC):  5


Bed to/from Chair:  5


Car Transfer (QC):  5





Weight Bearing


Weight Bearing Restriction:  Full Weight Bearing


Location Restriction:  LE Bilateral





Gait Training


Does the Patient Walk?:  Yes


Gait (FIM):  5


Distance (FIM):  3=150 ft


Distance:  200'


Walk 10 feet (QC):  5


Walk 50 ft with 2 Turns(QC):  5


Walk 150 ft (QC):  5


Walking 10ft/uneven surface-QC:  5


Gait Level of Assist:  5


Gait Persons Needed:  1


Gait Assistive Device:  FWW


Pt walks with antalgic gait pattern due to pain in L hip.  Pt's martha is slow 

but no LOB.





Wheelchair Training


Does the Pt Use a Wheelchair?:  No





Stair Training


 Stair Training: Handrails/:  2 handrails


Stairs (FIM):  5


#of Steps:  12


1 Step (curb) (QC):  5


4 Steps (QC):  5


12 Steps (QC):  5


Stairs:  Pattern:  Step to


Level of Assist:  5





Balance


Picking up an Object (QC):  88


Special Test Comments


Pt feels unsteady with bending over.





Treatments


Pt transfers from recliner to standing using FWW at SBA.  Pt uses restroom 

before ambulating.  Pt ambulates using FWW at SBA.  Pt ambualtes to [facility] 

for stair practice.  Pt ambulates stairs using 2 hand rails at step to gait 

pattern at SBA.  Pt then takes short rest break before ambulating in hallway 

using FWW at SBA .  Pt then returns to practice transfers from mat (supine to 

EOB to standing and vice versa) at SBA.  Pt returns to room to rest in recliner 

at end of tx with all needs met.





Assessment


Current Status:  Good Progress


Pt has improved with independence in transfers and mobility.





PT Short Term Goals


Short Term Goals


Time Frame:  2017


Gait (FIM):  4 (met 17)


Distance (FIM):  3=150 ft


Gait Assistive Device:  FWW


Stairs (FIM):  2 (met 2/10/17)


# of Steps:  4





PT Long Term Goals


Long Term Goals


PT Long Term Goals Time Frame:  2017


Transfers (B,C,W/C) (FIM):  7


Sit to Lying (QC):  6


Lying-Sitting on Side/Bed(QC):  6


Sit to Stand (QC):  6


Rollin


Roll Left to Right (QC):  6


Chair/Bed-to-Chair Xfer(QC):  6


Car Transfer (QC):  5


Does the Patient Walk:  Yes


Gait (FIM):  6


Gait distance (FIM):  3=150 ft


Walk 10 feet (QC):  6


Walk 10ft-Uneven Surface(QC):  6


Walk 50ft with 2 Turns (QC):  6


Walk 150 ft (QC):  6


Gait Level of Assist:  6


Gait Assistive Device:  FWW


Does the Pt use WC or Scooter?:  No


Stairs (FIM):  5


# of Steps:  8 (household distance)


1 Step (curb) (QC):  6


4 Steps (QC):  6


12 Steps (QC):  88 (no goal set; not indicated)


Stairs Level Of Assist:  6


Picking up an Object (QC):  5 (supervision)





PT Plan


Problem List


Problem List:  Activity Tolerance, Functional Strength





Treatment/Plan


Treatment Plan:  Continue Plan of Care


Treatment Plan:  Bed Mobility, Education, Functional Activity Evans, Functional 

Strength, Group Therapy, Gait, Safety, Therapeutic Exercise, Transfers


Treatment Duration:  2017


Visits Per Week:  10-15


Minutes/Day (M-F):  60-90


Minutes/Day (Sat/Cardona):  prn





Safety Risks/Education


Patient Education:  Gait Training, Transfer Techniques, Steps, Correct 

Positioning, Safety Issues


Teaching Recipient:  Patient


Teaching Methods:  Discussion


Response to Teaching:  Verbalize Understanding





Time/GCodes


Time In:  900


Time Out:  945


Total Billed Treatment Time:  45


Total Billed Treatment


visit, GT (15m), FA X2 (30m)








JESÚS HULL PTA Feb 15, 2017 15:24

## 2017-02-15 NOTE — SPEECH THERAPY DAILY NOTE
Speech Daily Progress Note


Subjective


The patient was seated upright in chair upon entrance. The patient greeted the 

clinician appropriately and agreed to participate in the cognitive treatment 

session on this date.





Objective


Safety Problem Solving: The clinician discussed photographs which contained 

safety issues in the patient's environment (review). The patient demonstrated 

increased accuracy on this date (90%) with mild clinician cueing for more 

complete responses and possible consequences.





Assessment


Assessment Current Status:  Good Progress





Treatment Plan


Continue Plan of Care





Communication


Comprehension:  4


Expression:  4





Social Cognition


Social Interaction:  5


Problem Solvin


Memory:  2





Speech Short Term Goals


Short Term Goals


Short Term Goals


1. The patient will recall and demonstrate three functional memory strategies.


2. The patient will display 80% accuracy with safety problem solving with mild 

clinician cueing.


3. The patient will demonstrated 90% accuracy (independently) with simple 

orientation information.


Time Frame-STG:  Two Weeks





Speech Long Term Goals


Long Term Goals


1. The patient will demonstrate improved cognition for increased safety and 

function with ADL's in the least restrictive setting.


Time Frame:  Three Weeks


Comprehension:  4


Expression:  5


Social Interaction:  5


Problem Solvin


Memory:  4





Speech-Plan


Treatment Plan


Speech Therapy Treatment Plan:  Continue Plan of Care


Continue skilled cognitive therapy to focus on functional memory and problem 

solving.


Treatment Duration:  2017


# of days/week


Four to five.


Visits Per Week:  Four to Five


Minutes/Day (M-F):  30


Rehab Potential:  Fair





Safety Risks/Education


Teaching Recipient:  Patient


Teaching Methods:  Discussion


Response to Teaching:  Verbalize Understanding


Education Topics Provided:  


Plan of Care





Time


Speech Therapy Time In:  09:45


Speech Therapy Time Out:  10:15


Total Billed Time:  30


Billed Treatment Time


Jose MiguelAVA [facility]IZABETH ST Feb 15, 2017 11:18

## 2017-02-15 NOTE — PM & R (SOAP) PROGRESS NOTE
Subjective


Subjective/Events-last exam


Patient was seen in her room this AM Patient SBA for transfers Discussed case 

with SW Discharge set for home with family tomorrow





Objective


Exam


Last Set of Vital Signs





Vital Signs








  Date Time  Temp Pulse Resp B/P Pulse Ox O2 Delivery O2 Flow Rate FiO2


 


2/15/17 09:00      Room Air  


 


2/15/17 04:29 97.7 83 18 159/80 94   





Capillary Refill : Less Than 3 Seconds


I&O











 Intake and Output 


 


 2/15/17





 00:00


 


Intake Total 840 ml


 


Balance 840 ml


 


 


 


Intake Oral 840 ml


 


# Voids 6


 


# Bowel Movements 2








General:  Alert, Cooperative, No Acute Distress


HEENT:  Atraumatic, PERRLA, EOMI, Mucous Memb Moist/Pink


Neck:  Supple, No JVD


Lungs:  Clear to Auscultation


Heart:  Regular Rate


Abdomen:  Normal Bowel Sounds, Soft, No Tenderness


Extremities:  Other (trace edema left ankle)


Neuro:  Other (Impaired strength prox left hip Cognition mildly impaired)





Results


Lab


 Laboratory Tests


2/14/17 15:06: 


Alanine Aminotransferase (ALT/SGPT) 13, Albumin 4.1, Alkaline Phosphatase 97, 

Anion Gap 12, Aspartate Amino Transf (AST/SGOT) 17, BUN/Creatinine Ratio 24, 

Basophils # (Auto) 0.1, Basophils (%) (Auto) 1, Blood Urea Nitrogen 20H, 

Calcium Level 8.5, Carbon Dioxide Level 24, Chloride Level 101, Creatinine 0.82

, Eosinophils # (Auto) 0.8H, Eosinophils (%) (Auto) 9, Estimat Glomerular 

Filtration Rate > 60, Glucose Level 162H, Hematocrit 36, Hemoglobin 11.6, 

Lymphocytes # (Auto) 1.8, Lymphocytes (%) (Auto) 21, Mean Corpuscular 

Hemoglobin 30, Mean Corpuscular Hemoglobin Concent 32, Mean Corpuscular Volume 

94, Mean Platelet Volume 9.5, Monocytes # (Auto) 0.8, Monocytes (%) (Auto) 9, 

Neutrophils # (Auto) 5.2, Neutrophils (%) (Auto) 60, Platelet Count 468H, 

Potassium Level 3.9, Red Blood Count 3.84L, Red Cell Distribution Width 14.5, 

Sodium Level 137, Total Bilirubin 0.9, Total Protein 6.5, White Blood Count 8.6





Assessment/Plan


Assessment


Fall /frx left hip s/p repair DR Garza WBAT


Prior fall with rt hip frx repair


Mild dementia


Presumed osteoporosis


Depression 


Constipation


Hypothyroidism


Postop anemia -improved s/p transfusion


Hallucinations reported may be multifactorial





Plan


Continue PT/OT


F/U with Hospitalist and ortho PRn


Michelle Psych xavier seen


Recheck labs -see orders


Reviewed meds-Will d/c Tramodol and see if decreases patients confusion-use 

tylenol instead for pain


Discharge remians set for tomorrow 2/16/17


Team Conference hedl earlier today-See report for full functional update and POC








EULA MARTINEZ MD Feb 15, 2017 18:32

## 2017-02-15 NOTE — PROGRESS NOTE-HOSPITALIST
Progress Note


Progress Notes/Assess & Plan


Date Seen


2/15/17


Diagonsis/Assessment & Plan


RN Review:


Pt is having active hallucinations, but passed her mental health exam. Pt's 

daughter is coming to [facility] today and will be with pt until DC tomorrow.





Patient Interview:


Pt states that she is having regular BMs.


Pt feels well.


Physical exam stable.


Pt states that she is ready for a DC, but is aware that she will continue to 

recover after DC.


Pt has no requests at this time.








No fever, vital signs stable, pleasant, frail, pale, poor recall


Regular rate and rhythm, clear to auscultation bilaterally


No edema








Assessment:


Status post hip fracture repair


Severe debility


Postop anemia requiring 2 units of packed red blood cell transfusion


Cognitive deficit noted poor recall w/paranoia


Hypothyroidism


Depression





Plan:


Maintain home medication


Monitor labs


Monitor vitals


DC tomorrow





Scribed by [facility] under the direct supervision of Dr. Pandya.








JUSTO PANDYA DO Feb 15, 2017 10:30

## 2017-02-15 NOTE — THERAPY GROUP DAILY NOTE
Therapy Daily Group Note


Patient Education Topic


Other List Below (Frality Syndrome & Prevention)





Exercises


LE Seated Exercise, UE Exercise





Other/Notes


Pt walked using FWW  at [facility] to [facility] for group.  Pt actively 

participated in OT/PT group.  Group consisted of introductions (name, place 

living, place you'd rather be instead of here), socialization, education on 

Frailty Syndrome and prevention, UE/LE seated exercises with resistance and 

other benefits of exercise. Pt contributed to discussions appropriately and 

demonstrated knowledge of topic by answering verbal questions.  Pt was able to 

complete UE/LE seated exercises.  After therapy, pt walked back to room and 

used restroom before returning to recliner to rest.  All needs met.


Start Time:  13:00


Stop Time:  14:10


Total Billed Treatment Time:  70


Total Billed Treatment


1, GRP








JESÚS HULL PTA Feb 15, 2017 16:21

## 2017-02-15 NOTE — OCCUPATIONAL THER DAILY NOTE
OT Current Status-Daily Note


Subjective


Pt alert, sitting in chair finishing breakfast.  Pt agreed to therapy.  No c/o 

pain at  this time.





Mental Status/Objective


Patient Orientation:  Person, Place, Time, Situation


              Functional [place] Measure


0=Not Assessed/NA   4=Minimal Assistance


1=Total Assistance   5=Supervision or Setup


2=Maximal Assistance   6=Modified [place]


3=Moderate Assistance   7=Complete [place]





ADL-Treatment


              Functional [place] Measure


0=Not Assessed/NA   4=Minimal Assistance


1=Total Assistance   5=Supervision or Setup


2=Maximal Assistance   6=Modified [place]


3=Moderate Assistance   7=Complete IndependenceIRFPAI Quality Coding Scale











6 Independent with activity with or without an assistive device


 


5  Patient requires set up or clean up by helper.  Patient completes activity  

by  themselves


 


4 Supervision or touching assist (CGA). Allen provide cues , steadying assist


 


3 The helper provides less than half the effort to complete the activity


 


2 The helper provides more than half the effort to complete the activity


 


1 Dependent.  The helper does all the effort to complete an activity 


 


7 Patient refused to complete or attempt activity


 


9 The patient did not perform the activity before the current illness or injury


 


88 Not attempted due to Medical conditions or safety concerns








Eating (FIM):  6 (Dentures.  Pt is able to set self up and use regular utensils 

to feed self and cut food.)


Eating (QC):  6 (Dentures.  Pt is able to set self up and use regular utensils 

to feed self and cut food.)


Grooming (FIM):  6 (Standing at sink with FWW, pt is able to complete all 

grooming. )


Oral Hygiene (QC):  6 (Standing at sink with FWW, pt is able to complete oral 

care.)


Toileting Hygiene (QC):  6 (Using grabbars and BSC, pt is able to complete 

toileting by self.)


Bathing (FIM):  6 (Using shower bench, grabbar and hand held shower pt is able 

to complete bathing.)


Shower/Bathe Self (QC):  6 (Using shower bench, grabbar and hand held shower pt 

is able to complete bathing.)


Upper Body (FIM):  5 (After set up, pt is able to complete by self.  Pt tends 

to want to stand to complete dressing then will realize that she is steadier 

when sitting down and finishes her dressing.)


Upper Body Dressing (QC):  5 (After set up, pt is able to complete by self.  Pt 

tends to want to stand to complete dressing then will realize that she is 

steadier when sitting down and finishes her dressing.)


Lower Body Dressing (FIM):  5 (After set up, pt is able to complete by self.  

Pt tends to want to stand to complete dressing then will realize that she is 

steadier when sitting down and finishes her dressing.)


Lower Body Dressing (QC):  5 (After set up, pt is able to complete by self.  Pt 

tends to want to stand to complete dressing then will realize that she is 

steadier when sitting down and finishes her dressing.)


On/Off Footwear (QC):  5 (After set up, pt is able to don/doff footwear.)


Toileting (FIM):  6 (Using grabbars and BSC, pt is able to complete toileting 

by self.)


Transfers (B, C, W/C) (FIM):  5 (Supervision for sit to stand transfers)


Toilet/Commode Transfer (FIM):  5 (Supervision for sit to stand transfers using 

grabbars and BSC.)


Toilet Transfer (QC):  4 (Supervision for sit to stand transfers using grabbars 

and BSC.)


Shower Transfer(FIM):  5 (Supervision for sit to stand transfers using grabbars 

and shower bench, FWW.)


After therapy, pt sitting in recliner with call light/phone in reach.  All 

needs met in room.





OT Short Term Goals


Short Term Goals


Time Frame:  2017


Bathing(FIM):  5 (met-2/15/17)


Toileting(FIM):  5 (met-2/15/17)


Toilet/Commode Transfer(FIM):  5 (met-2/15/17)


Shower Transfer(FIM):  5 (met-2/15/17)


Additional Short Term Goals:  2-Verbalize Understanding, 3-ImproveStrength/Evans


1=Demonstrate adherence to instructed precautions during ADL tasks.


2=Patient will verbalize/demonstrate understanding of assistive devices/

modifications for ADL.


3=Patient will improve strength/tolerance for activity to enable patient to 

perform ADL's.





OT Long Term Goals


Long Term Goals


Time Frame:  2017


Eating (FIM):  6 (met-2/15/17)


Eating (QC):  6 (met-2/15/17)


Groomin (met-2/15/17)


Oral Hygiene (QC):  6 (met-2/15/17)


Bathing(FIM):  6 (met-2/15/17)


Shower/Bathe Self (QC):  6 (met-2/15/17)


Upper Body Dressing(FIM):  6 (not met)


Upper Body Dressing (QC):  6 (not met)


Lower Body Dressing(FIM):  6 (not met)


Lower Body Dressing (QC):  6 (not met)


On/Off Footwear (QC):  6 (not met)


Toileting(FIM):  6 (met-2/15/17)


Toileting Hygiene (QC):  6 (met-2/15/17)


Toilet/Commode Transfer(FIM):  6 (not met)


Toilet/Commode Transfer (QC):  6 (not met)


Comprehension(FIM):  4


Expression (FIM):  5


Social Interaction(FIM):  5


Problem Solving(FIM):  4


Memory(FIM):  4


Additional Goals:  2-Verbalize Understanding, 3-ImproveStrength/Evans


1=Demonstrate adherence to instructed precautions during ADL tasks.


2=Patient will verbalize/demonstrate understanding of assistive devices/

modifications for ADL.


3=Patient will improve strength/tolerance for activity to enable patient to 

perform ADL's.





OT Education/Plan


Problem List/Assessment


Pt would benefit from skilled OT to increase her independence in basic self 

care to allow her to safely return to her home to live independently and to 

decrease caregiver burden





Discharge Recommendations


Plan/Recommendations:  Continue POC





Treatment Plan/Plan of Care


Patient would benefit from OT for education, treatment and training to promote 

independence in ADL's, mobility, safety and/or upper extremity function for ADL'

s.


Plan of Care:  ADL Retraining, Functional Mobility, Group Exercise/Act as Ind (

education, exercise, activity tolerance, memory, socialization, functional 

activities), UE Funct Exercise/Act, UE Neuromus Re-Ed/Coord


Treatment Duration:  2017


Visits Per Week:  10-11


Minutes/Day (M-F):  75-90


Minutes/Day (Sat/Cardona):  PRN


Agreement:  Yes


Rehab Potential:  Fair





Time/GCodes


Start Time:  08:15


Stop Time:  09:00


Total Time Billed (hr/min):  45


Billed Treatment Time


1 visit-ADL 3 (45 min)








BRE PLAZA Feb 15, 2017 10:00

## 2017-02-16 VITALS — DIASTOLIC BLOOD PRESSURE: 71 MMHG | SYSTOLIC BLOOD PRESSURE: 154 MMHG

## 2017-02-16 VITALS — SYSTOLIC BLOOD PRESSURE: 165 MMHG | DIASTOLIC BLOOD PRESSURE: 79 MMHG

## 2017-02-16 RX ADMIN — Medication SCH MG: at 06:23

## 2017-02-16 RX ADMIN — LEVOTHYROXINE SODIUM SCH MCG: 125 TABLET ORAL at 06:23

## 2017-02-16 RX ADMIN — DOCUSATE SODIUM AND SENNOSIDES SCH EA: 8.6; 5 TABLET, FILM COATED ORAL at 08:11

## 2017-02-16 RX ADMIN — OMEGA-3 FATTY ACIDS CAP 1000 MG SCH MG: 1000 CAP at 06:23

## 2017-02-16 RX ADMIN — ASPIRIN SCH MG: 81 TABLET ORAL at 08:11

## 2017-02-16 RX ADMIN — Medication SCH EA: at 06:23

## 2017-02-16 NOTE — THERAPY TEAM DISCHARGE SUMMARY
Therapy Discharge Summary


Discharge Recommendations


Date of Discharge


 2017


Therapy D/C Recommendations:  Other, See Comments





Occupational Therapy


Pt was seen for skilled OT to increase her independence in basic self care and 

decrease caregiver burden after a fall with resultant hip fx and repair. On 

admission she needed no help with eating, setup for grooming, min to CGA for 

bathing, upper body dressing, toileting and toilet transfers and mod assist 

lower body dressing. By discharge she had progressed to modified independence 

eating, grooming, bathing, toileting , setup for dressing and supervision for 

toilet and shower transfers. Equipment used included BSC, FWW, shower bench, 

grab bars and hand held shower. Pt was discharged to her daughter's home in 

[place] and may benefit from home health OT. See tx plan for goals met. DC OT





PT Long Term Goals


Long Term Goals


PT Long Term Goals Time Frame:  2017


Transfers (B,C,W/C) (FIM):  7


Roll Left to Right (QC):  6


Sit to Lying (QC):  6


Lying-Sitting on Side/Bed(QC):  6


Sit to Stand (QC):  6


Chair/Bed-to-Chair Xfer(QC):  6


Car Transfer (QC):  5


Does the Patient Walk:  Yes


Gait (FIM):  6


Gait distance (FIM):  3=150 ft


Walk 10 feet (QC):  6


Walk 10ft-Uneven Surface(QC):  6


Walk 50ft with 2 Turns (QC):  6


Walk 150 ft (QC):  6


Gait Level of Assist:  6


Gait Assistive Device:  FWW


Does the Pt use WC or Scooter?:  No


Stairs (FIM):  5


# of Steps:  8 (household distance)


1 Step (curb) (QC):  6


4 Steps (QC):  6


12 Steps (QC):  88 (no goal set; not indicated)


Stairs Level Of Assist:  6


Picking up an Object (QC):  5 (supervision)





OT Long Term Goals


Long Term Goals


Time Frame:  2017


Eating (FIM):  6 (Met 2-15-17)


Eating (QC):  6 (Met 2-15-17)


Oral Hygiene (QC):  6 (Met 2-15-17)


Grooming(FIM):  6 (Met 2-15-17)


Bathing(FIM):  6 (Met 2-15-17)


Shower/Bathe Self (QC):  6 (Met 2-15-17)


Upper Body Dressing(FIM):  6 (Not met 2-15-17)


Upper Body Dressing (QC):  6 (Not met 2-15-17)


Lower Body Dressing(FIM):  6 (Not met 2-15-17)


Lower Body Dressing (QC):  6 (Not met 2-15-17)


On/Off Footwear (QC):  6 (Not met 2-15-17)


Toileting(FIM):  6 (Met 2-15-17)


Toileting Hygiene (QC):  6 (Met 2-15-17)


Toilet/Commode Transfer(FIM):  6 (Not met 2-15-17)


Toilet/Commode Transfer (QC):  6 (Not met 2-15-17)


Comprehension(FIM):  4


Expression (FIM):  5


Social Interaction(FIM):  5


Problem Solving(FIM):  4


Memory(FIM):  4


Additional Goals:  2-Verbalize Understanding, 3-ImproveStrength/Evans


1=Demonstrate adherence to instructed precautions during ADL tasks.


2=Patient will verbalize/demonstrate understanding of assistive devices/

modifications for ADL.


3=Patient will improve strength/tolerance for activity to enable patient to 

perform ADL's.





Speech Long Term Goals


Long Term Goals


1. The patient will demonstrate improved cognition for increased safety and 

function with ADL's in the least restrictive setting.


Time Frame:  Three Weeks


Comprehension:  4


Expression:  5


Social Interaction:  5


Problem Solvin


Memory:  4








NOMAN YOUNG OT 2017 08:43

## 2017-02-16 NOTE — THERAPY TEAM DISCHARGE SUMMARY
Therapy Discharge Summary


Discharge Recommendations


Date of Discharge





Therapy D/C Recommendations:  Other, See Comments





Physical Therapy


Patient came to rehab following an  intertrochanteric hip fx left side.  Upon 

admission patient performed bed mobility and transfers with mod to min assist, 

ambulated 50' with a rolling walker with CGA, and could go up and down 1 step 

with a rolling walker with mod assist.  Patient has been performing bed 

mobility and transfer training, balance and endurance training, functional 

strengthening, stair training, gait training, and education.  Patient has made 

fair progress but has only met her long term goal for stairs.  Now, patient 

performs bed mobility and transfers with SBA, ambulates 200' with a rolling 

walker with SBA (including 10' over an uneven surface like carpet and 50' with 

at least 2 turns of 90 degrees), and can go up and down 12 steps using 2 

handrails with SBA.  Patient is being discharged from this facility today and 

will be discharged from PT at this time.





PT Long Term Goals


Long Term Goals


PT Long Term Goals Time Frame:  2017


Transfers (B,C,W/C) (FIM):  7


Roll Left to Right (QC):  6


Sit to Lying (QC):  6


Lying-Sitting on Side/Bed(QC):  6


Sit to Stand (QC):  6


Chair/Bed-to-Chair Xfer(QC):  6


Car Transfer (QC):  5


Does the Patient Walk:  Yes


Gait (FIM):  6


Gait distance (FIM):  3=150 ft


Walk 10 feet (QC):  6


Walk 10ft-Uneven Surface(QC):  6


Walk 50ft with 2 Turns (QC):  6


Walk 150 ft (QC):  6


Gait Level of Assist:  6


Gait Assistive Device:  FWW


Does the Pt use WC or Scooter?:  No


Stairs (FIM):  5


# of Steps:  8 (household distance)


1 Step (curb) (QC):  6


4 Steps (QC):  6


12 Steps (QC):  88 (no goal set; not indicated)


Stairs Level Of Assist:  6


Picking up an Object (QC):  5 (supervision)





OT Long Term Goals


Long Term Goals


Time Frame:  2017


Eating (FIM):  6 (Met 2-15-17)


Eating (QC):  6 (Met 2-15-17)


Oral Hygiene (QC):  6 (Met 2-15-17)


Grooming(FIM):  6 (Met 2-15-17)


Bathing(FIM):  6 (Met 2-15-17)


Shower/Bathe Self (QC):  6 (Met 2-15-)


Upper Body Dressing(FIM):  6 (Not met 2-15-17)


Upper Body Dressing (QC):  6 (Not met 2-15-17)


Lower Body Dressing(FIM):  6 (Not met 2-15-17)


Lower Body Dressing (QC):  6 (Not met 2-15-17)


On/Off Footwear (QC):  6 (Not met 2-15-17)


Toileting(FIM):  6 (Met 2-15-17)


Toileting Hygiene (QC):  6 (Met 2-15-17)


Toilet/Commode Transfer(FIM):  6 (Not met 2-15-17)


Toilet/Commode Transfer (QC):  6 (Not met 2-15-17)


Comprehension(FIM):  4 (Met)


Expression (FIM):  5 (MET)


Social Interaction(FIM):  5 (MET)


Problem Solving(FIM):  4 (NOT MET)


Memory(FIM):  4 (NOT MET)


Additional Goals:  2-Verbalize Understanding, 3-ImproveStrength/Evans


1=Demonstrate adherence to instructed precautions during ADL tasks.


2=Patient will verbalize/demonstrate understanding of assistive devices/

modifications for ADL.


3=Patient will improve strength/tolerance for activity to enable patient to 

perform ADL's.





Speech Long Term Goals


Long Term Goals


1. The patient will demonstrate improved cognition for increased safety and 

function with ADL's in the least restrictive setting.


Time Frame:  Three Weeks


Comprehension:  4 (Met)


Expression:  5 (MET)


Social Interaction:  5 (MET)


Problem Solvin (NOT MET)


Memory:  4 (NOT MET)








SCARLET ASKEW PT 2017 09:58

## 2017-02-16 NOTE — PM & R (SOAP) PROGRESS NOTE
Subjective


Subjective/Events-last exam


Patient was seen in her room this AM All set for discharge to home with family 

in [facility] Tramadol d/c last evening and Tylenol ordered for pain prn-this 

may lessen confusion





Objective


Exam


Last Set of Vital Signs





Vital Signs








  Date Time  Temp Pulse Resp B/P Pulse Ox O2 Delivery O2 Flow Rate FiO2


 


2/16/17 05:25 97.7 80 18 165/79 97 Room Air  





Capillary Refill : Less Than 3 Seconds


I&O











 Intake and Output 


 


 2/16/17





 00:00


 


Intake Total 934 ml


 


Balance 934 ml


 


 


 


Intake Oral 934 ml


 


# Voids 5


 


# Bowel Movements 2








General:  Alert, Cooperative, No Acute Distress


HEENT:  Atraumatic, PERRLA, EOMI, Mucous Memb Moist/Pink


Neck:  Supple, No JVD


Lungs:  Clear to Auscultation


Heart:  Regular Rate


Abdomen:  Normal Bowel Sounds, Soft, No Tenderness


Extremities:  Other (trace edema left ankle)


Neuro:  Other (Impaired strength prox left hip Cognition mildly impaired)





Results


Lab


 Laboratory Tests


2/14/17 15:06: 


Alanine Aminotransferase (ALT/SGPT) 13, Albumin 4.1, Alkaline Phosphatase 97, 

Anion Gap 12, Aspartate Amino Transf (AST/SGOT) 17, BUN/Creatinine Ratio 24, 

Basophils # (Auto) 0.1, Basophils (%) (Auto) 1, Blood Urea Nitrogen 20H, 

Calcium Level 8.5, Carbon Dioxide Level 24, Chloride Level 101, Creatinine 0.82

, Eosinophils # (Auto) 0.8H, Eosinophils (%) (Auto) 9, Estimat Glomerular 

Filtration Rate > 60, Glucose Level 162H, Hematocrit 36, Hemoglobin 11.6, 

Lymphocytes # (Auto) 1.8, Lymphocytes (%) (Auto) 21, Mean Corpuscular 

Hemoglobin 30, Mean Corpuscular Hemoglobin Concent 32, Mean Corpuscular Volume 

94, Mean Platelet Volume 9.5, Monocytes # (Auto) 0.8, Monocytes (%) (Auto) 9, 

Neutrophils # (Auto) 5.2, Neutrophils (%) (Auto) 60, Platelet Count 468H, 

Potassium Level 3.9, Red Blood Count 3.84L, Red Cell Distribution Width 14.5, 

Sodium Level 137, Total Bilirubin 0.9, Total Protein 6.5, White Blood Count 8.6





Assessment/Plan


Assessment


Fall /frx left hip s/p repair DR Garza WBAT


Prior fall with rt hip frx repair


Mild dementia


Presumed osteoporosis


Depression 


Constipation


Hypothyroidism


Postop anemia -improved s/p transfusion


Hallucinations reported may be multifactorial





Plan





Michelle Psych xavier seen


Recheck labs -see orders-done


Reviewed meds- d/cd Tramodol and see if decreases patients confusion-use 

tylenol instead for pain 


Discharge today to home with HHC and family -See orders


F/U with PCP and Ortho








EULA MARTINEZ MD Feb 16, 2017 08:23

## 2017-02-16 NOTE — THERAPY TEAM DISCHARGE SUMMARY
Therapy Discharge Summary


Discharge Recommendations


Date of Discharge





Therapy D/C Recommendations:  Other, See Comments





Speech-Language Pathology


The patient was admitted to [facility] with a hip 

fracture. Upon admission, the patient demonstrated moderate cognitive 

impairments in the areas of problem solving and memory (immediate and delayed). 

Skilled speech therapy focused on functional safety problem solving and 

functional memory strategies for use in the home. The patient did not meet 

cognitive goals initiated at the beginning of therapy and continues to 

demonstrate fluctuating confusion daily. The clinician encourages the patient 

to be discharged with care from family to ensure safety throughout her daily 

routines. Home health care is recommended post discharge from acute 

rehabilitation.





PT Long Term Goals


Long Term Goals


PT Long Term Goals Time Frame:  2017


Transfers (B,C,W/C) (FIM):  7


Roll Left to Right (QC):  6


Sit to Lying (QC):  6


Lying-Sitting on Side/Bed(QC):  6


Sit to Stand (QC):  6


Chair/Bed-to-Chair Xfer(QC):  6


Car Transfer (QC):  5


Does the Patient Walk:  Yes


Gait (FIM):  6


Gait distance (FIM):  3=150 ft


Walk 10 feet (QC):  6


Walk 10ft-Uneven Surface(QC):  6


Walk 50ft with 2 Turns (QC):  6


Walk 150 ft (QC):  6


Gait Level of Assist:  6


Gait Assistive Device:  FWW


Does the Pt use WC or Scooter?:  No


Stairs (FIM):  5


# of Steps:  8 (household distance)


1 Step (curb) (QC):  6


4 Steps (QC):  6


12 Steps (QC):  88 (no goal set; not indicated)


Stairs Level Of Assist:  6


Picking up an Object (QC):  5 (supervision)





OT Long Term Goals


Long Term Goals


Time Frame:  2017


Eating (FIM):  6 (Met 2-15-17)


Eating (QC):  6 (Met 2-15-17)


Oral Hygiene (QC):  6 (Met 2-15-17)


Grooming(FIM):  6 (Met 2-15-17)


Bathing(FIM):  6 (Met 2-15-17)


Shower/Bathe Self (QC):  6 (Met 2-15-17)


Upper Body Dressing(FIM):  6 (Not met 2-15-17)


Upper Body Dressing (QC):  6 (Not met 2-15-17)


Lower Body Dressing(FIM):  6 (Not met 2-15-17)


Lower Body Dressing (QC):  6 (Not met 2-15-17)


On/Off Footwear (QC):  6 (Not met 2-15-17)


Toileting(FIM):  6 (Met 2-15-17)


Toileting Hygiene (QC):  6 (Met 2-15-)


Toilet/Commode Transfer(FIM):  6 (Not met 2-15-17)


Toilet/Commode Transfer (QC):  6 (Not met 2-15-)


Comprehension(FIM):  4


Expression (FIM):  5


Social Interaction(FIM):  5


Problem Solving(FIM):  4


Memory(FIM):  4


Additional Goals:  2-Verbalize Understanding, 3-ImproveStrength/Evans


1=Demonstrate adherence to instructed precautions during ADL tasks.


2=Patient will verbalize/demonstrate understanding of assistive devices/

modifications for ADL.


3=Patient will improve strength/tolerance for activity to enable patient to 

perform ADL's.





Speech Long Term Goals


Long Term Goals


1. The patient will demonstrate improved cognition for increased safety and 

function with ADL's in the least restrictive setting.


Time Frame:  Three Weeks


Comprehension:  4 (Met)


Expression:  5 (MET)


Social Interaction:  5 (MET)


Problem Solvin (NOT MET)


Memory:  4 (NOT MET)








MILDRED VARGAS 2017 09:35

## 2017-02-16 NOTE — OCC THERAPY PROGRESS NOTE
Therapy Progress Note


Pt. leaving this date.  OT assisted with transfer to car.  Transferred to 

wheelchair with mod I.  Transferred from wheelchair to car with mod I, and then 

into car with no difficulty.  Daughter taking pt. home this date.








[id number]


1, FA x 10minutes








ISABEL MATTA OT Feb 16, 2017 13:05

## 2017-03-17 NOTE — DISCHARGE SUMMARY
DATE OF ADMISSION: 

02/03/2017

 

DATE OF DISCHARGE: 

02/16/2017 

 

HISTORY OF PRESENT ILLNESS:

The patient is an 84-year-old female who lives alone in [place], [place] who has two sisters nearby that help her out.  She has

been modified independent with a walker prior to falling at home

and sustaining a left hip fracture. She was admitted [facility] for orthopedic evaluation to the service of Dr. Pandya

hospitalist on 02/01/2017.  She was seen by Dr. Garza,

orthopedics, who did a prior hip procedure  on her years ago. The

patient underwent repair. The patient was then referred to

Inpatient Rehabilitation Unit.  She has had a decline in her

functional independence. Therapies were begun on orthopedic

floor. The patient was felt to be appropriate for Inpatient

Rehabilitation Unit. 

 

PAST MEDICAL HISTORY:

1.   Right hip fracture 2004 with repair, Dr. Garza.

2.   Presumed osteoporosis. 

3.   Depression. 

4.   Constipation. 

5.   She has had some mild postoperative delirium, has not

cleared entirely and there may be some baseline dementia or

cognitive impairment.

6.   Hypothyroidism on replacement. 

 

MEDICAL COURSE:  

The patient was followed by Dr. Cruz and Dr. Pandya while on

rehab unit. She had some hallucinations. The patient's daughter

felt that might have been due to tramadol; this was discontinued.

 She was followed by PT, OT, speech and also seen by behavioral

health, Marianna Hurt with impression of adjustment disorder with

depression nonspecified neuro cognitive disorder.  It was

recommended that she have follow-up testing with Dr. Bia Waters

on an outpatient basis.  The patient did require transfusion of

packed red blood cells while on the unit as her hemoglobin was

6.8 on 02/04 and improved to 11.6 post transfusion on 02/14 with

hemoglobin of 36 and platelet count 468,000.  Chemistry on 02/14

showed BUN 20, blood glucose 152, albumin had improved to 3.1 on

 02/07 to 4.1 on 02/14.  UA was negative on 02/12. The patient

was afebrile upon discharge, pulse is 77 on 02/16, respirations

18, blood pressure 154/71.  O2 sat 98% on room air. The patient's

daughter discussed the case with  and daughter

agreed to take her home with her and be responsible for her as

there were still some cognitive issues. Her incision site was

healing well. 

 

REHABILITATION COURSE:

The patient was seen by physical therapy upon admission and noted

that the patient could perform bed mobility and transfers mid to

mod assist, could ambulate 50 feet with a wheeled walker with

contact guard walking down once steps with a wheeled walker with

mod assist.  Upon discharge the patient could perform bed

mobility and transfers, standby assist, could ambulate 20 feet

with a wheeled walker with standby assist and go up and down 12

steps using 2 handrails with standby assist. Speech therapy noted

upon admission, the patient demonstrated mild cognitive

impairments in areas of problem solving and memory - immediate

and delayed.  The patient did not meet all cognitive goals

initiated and she continued to demonstrate fluctuating confusion

daily.  The clinician encouraged the patient be discharged with

care from family to ensure safety throughout her daily routine.

OT notes upon admission, she was independent with eating. Set up

for grooming. Min assist to contact off the bathing, upper body

dressing, toileting and toilet transfers with mod assist for

lower body dressing. By discharge she has  progressed to modified

independent for feeding, grooming, bathing and toileting, set-up

for dressing, supervision for toilet and shower transfers.

Equipment used included bedside commode, front wheel walker,

shower bench, grab bars, and hand-held shower. The patient will

be discharged to her daughter's home in [place]. 

 

DISCHARGE INSTRUCTIONS:

The patient will have follow-up home health care, follow-up PCP

and Dr. Garza, orthopedics. Continue current diet. The patient

is weight-bearing as tolerated. 

 

DISCHARGE MEDICATIONS:

1.   Tylenol 1000 mg p.o. q.6 hours p.r.n. mild pain. 

2.   Niferex 150 mg p.o. b.i.d. with meals. 

3.    mg p.o. daily. 

4.   Calcium carbonate with vitamin D3 1 tablet p.o. b.i.d.

5.   Escitalopram 10 mg p.o. daily. 

6.   Levothyroxine 125 mcg p.o. daily. 

7.   Multivitamins 1 tablet p.o. daily. 

8.   Fish oil 1000 mg p.o. daily. 

9.   Senokot-S 1 tablet p.o. b.i.d. 

 

DISCHARGE DIAGNOSES:

1.   Rehabilitation ambulatory dysfunction secondary to left

nondisplaced intertrochanteric hip fracture, status post fall,

status post repair, Dr. Greg, weight-bearing as tolerated. 

2.   Mild dementia. 

3.   Hypothyroidism on replacement. 

4.   Postoperative anemia, improved, status post transfusion. 

5.   Hallucinations, associated with mild dementia, pain and

probable pain medications with those taper to this point. 

6.   Had postoperative respiratory insufficiency.

7.   O2 dependence. 

8.   Depression on medication. 

9.   Postop constipation, treated. 

10.  Hallucinations, improving. 

11.  Hypoalbuminemia, improved. 

 

CONDITION AT DISCHARGE:

Improved and stable. 

 

PROGNOSIS:

Rehab prognosis appears good for some continued improvement

however, due to her baseline dementia, age, history of falls,

fracture, orthopedic repairs and other comorbidities, she will

most likely continue to require close supervision and may benefit

from a more formal assisted living setting in the near future.  

 

 

 

Job ID: [id number] 

Dictated Date: 03/16/2017 14:12:51 

Transcription Date: 03/17/2017 08:41:39/guerline

## 2019-07-03 NOTE — PROGRESS NOTE-PRE OPERATIVE
Pre-Operative Progress Note


H&P Reviewed


The H&P was reviewed, patient examined and no changes noted.


Date H&P Reviewed:  Feb 1, 2017


Time H&P Reviewed:  17:00


Pre-Operative Diagnosis:  Nondisplaced Left hip intertrochanteric fracture








TRAE FARNSWORTH DO Feb 1, 2017 5:05 pm
no laceration/no loss of consciousness

## 2021-01-25 ENCOUNTER — HOSPITAL ENCOUNTER (OUTPATIENT)
Dept: HOSPITAL 75 - GIR | Age: 86
End: 2021-01-25
Attending: NURSE PRACTITIONER
Payer: COMMERCIAL

## 2021-01-25 DIAGNOSIS — Z20.822: Primary | ICD-10-CM

## 2021-01-25 PROCEDURE — 87635 SARS-COV-2 COVID-19 AMP PRB: CPT
